# Patient Record
Sex: FEMALE | Race: WHITE | Employment: OTHER | URBAN - METROPOLITAN AREA
[De-identification: names, ages, dates, MRNs, and addresses within clinical notes are randomized per-mention and may not be internally consistent; named-entity substitution may affect disease eponyms.]

---

## 2021-04-02 ENCOUNTER — TRANSCRIBE ORDER (OUTPATIENT)
Dept: SCHEDULING | Age: 71
End: 2021-04-02

## 2021-06-07 ENCOUNTER — APPOINTMENT (OUTPATIENT)
Dept: CT IMAGING | Age: 71
End: 2021-06-07
Attending: EMERGENCY MEDICINE
Payer: MEDICARE

## 2021-06-07 ENCOUNTER — HOSPITAL ENCOUNTER (EMERGENCY)
Age: 71
Discharge: HOME | End: 2021-06-07
Attending: EMERGENCY MEDICINE
Payer: MEDICARE

## 2021-06-07 VITALS
OXYGEN SATURATION: 99 % | HEIGHT: 70 IN | SYSTOLIC BLOOD PRESSURE: 161 MMHG | RESPIRATION RATE: 17 BRPM | WEIGHT: 126 LBS | TEMPERATURE: 98.3 F | BODY MASS INDEX: 18.04 KG/M2 | HEART RATE: 78 BPM | DIASTOLIC BLOOD PRESSURE: 89 MMHG

## 2021-06-07 DIAGNOSIS — R51.9 ACUTE NONINTRACTABLE HEADACHE, UNSPECIFIED HEADACHE TYPE: Primary | ICD-10-CM

## 2021-06-07 LAB
ALBUMIN SERPL-MCNC: 4.1 G/DL (ref 3.5–5)
ALBUMIN/GLOB SERPL: 1.2 {RATIO} (ref 1.1–2.2)
ALP SERPL-CCNC: 101 U/L (ref 45–117)
ALT SERPL-CCNC: 31 U/L (ref 12–78)
ANION GAP SERPL CALC-SCNC: 9 MMOL/L (ref 5–15)
AST SERPL W P-5'-P-CCNC: 20 U/L (ref 15–37)
BASOPHILS # BLD: 0 K/UL (ref 0–0.2)
BASOPHILS NFR BLD: 1 % (ref 0–2.5)
BILIRUB SERPL-MCNC: 0.4 MG/DL (ref 0.2–1)
BUN SERPL-MCNC: 15 MG/DL (ref 6–20)
BUN/CREAT SERPL: 20 (ref 12–20)
CA-I BLD-MCNC: 9.4 MG/DL (ref 8.5–10.1)
CHLORIDE SERPL-SCNC: 106 MMOL/L (ref 97–108)
CO2 SERPL-SCNC: 28 MMOL/L (ref 21–32)
CREAT SERPL-MCNC: 0.76 MG/DL (ref 0.55–1.02)
EOSINOPHIL # BLD: 0 K/UL (ref 0–0.7)
EOSINOPHIL NFR BLD: 0 % (ref 0.9–2.9)
ERYTHROCYTE [DISTWIDTH] IN BLOOD BY AUTOMATED COUNT: 13.2 % (ref 11.5–14.5)
GLOBULIN SER CALC-MCNC: 3.4 G/DL (ref 2–4)
GLUCOSE SERPL-MCNC: 138 MG/DL (ref 65–100)
HCT VFR BLD AUTO: 44.9 % (ref 36–46)
HGB BLD-MCNC: 15.2 G/DL (ref 13.5–17.5)
LYMPHOCYTES # BLD: 0.6 K/UL (ref 1–4.8)
LYMPHOCYTES NFR BLD: 10 % (ref 20.5–51.1)
MAGNESIUM SERPL-MCNC: 1.8 MG/DL (ref 1.6–2.4)
MCH RBC QN AUTO: 33.1 PG (ref 31–34)
MCHC RBC AUTO-ENTMCNC: 33.9 G/DL (ref 31–36)
MCV RBC AUTO: 97.7 FL (ref 80–100)
MONOCYTES # BLD: 0.1 K/UL (ref 0.2–2.4)
MONOCYTES NFR BLD: 2 % (ref 1.7–9.3)
NEUTS SEG # BLD: 5.4 K/UL (ref 1.8–7.7)
NEUTS SEG NFR BLD: 87 % (ref 42–75)
NRBC # BLD: 0 K/UL
NRBC BLD-RTO: 0.1 PER 100 WBC
PLATELET # BLD AUTO: 159 K/UL (ref 150–400)
PMV BLD AUTO: 7.9 FL (ref 6.5–11.5)
POTASSIUM SERPL-SCNC: 3.7 MMOL/L (ref 3.5–5.1)
PROT SERPL-MCNC: 7.5 G/DL (ref 6.4–8.2)
RBC # BLD AUTO: 4.59 M/UL (ref 4.5–5.9)
SODIUM SERPL-SCNC: 143 MMOL/L (ref 136–145)
WBC # BLD AUTO: 6.2 K/UL (ref 4.4–11.3)

## 2021-06-07 PROCEDURE — 74011000250 HC RX REV CODE- 250: Performed by: EMERGENCY MEDICINE

## 2021-06-07 PROCEDURE — 85025 COMPLETE CBC W/AUTO DIFF WBC: CPT

## 2021-06-07 PROCEDURE — 96375 TX/PRO/DX INJ NEW DRUG ADDON: CPT

## 2021-06-07 PROCEDURE — 96374 THER/PROPH/DIAG INJ IV PUSH: CPT

## 2021-06-07 PROCEDURE — 36415 COLL VENOUS BLD VENIPUNCTURE: CPT

## 2021-06-07 PROCEDURE — 74011250636 HC RX REV CODE- 250/636: Performed by: EMERGENCY MEDICINE

## 2021-06-07 PROCEDURE — 99284 EMERGENCY DEPT VISIT MOD MDM: CPT

## 2021-06-07 PROCEDURE — 83735 ASSAY OF MAGNESIUM: CPT

## 2021-06-07 PROCEDURE — 70450 CT HEAD/BRAIN W/O DYE: CPT

## 2021-06-07 PROCEDURE — 80053 COMPREHEN METABOLIC PANEL: CPT

## 2021-06-07 RX ORDER — FENTANYL CITRATE 50 UG/ML
25 INJECTION, SOLUTION INTRAMUSCULAR; INTRAVENOUS
Status: COMPLETED | OUTPATIENT
Start: 2021-06-07 | End: 2021-06-07

## 2021-06-07 RX ORDER — ONDANSETRON 4 MG/1
4 TABLET, ORALLY DISINTEGRATING ORAL
Qty: 20 TABLET | Refills: 0 | Status: SHIPPED | OUTPATIENT
Start: 2021-06-07

## 2021-06-07 RX ORDER — ONDANSETRON 2 MG/ML
4 INJECTION INTRAMUSCULAR; INTRAVENOUS
Status: COMPLETED | OUTPATIENT
Start: 2021-06-07 | End: 2021-06-07

## 2021-06-07 RX ORDER — KETOROLAC TROMETHAMINE 30 MG/ML
30 INJECTION, SOLUTION INTRAMUSCULAR; INTRAVENOUS
Status: COMPLETED | OUTPATIENT
Start: 2021-06-07 | End: 2021-06-07

## 2021-06-07 RX ORDER — BUTALBITAL, ASPIRIN, AND CAFFEINE 325; 50; 40 MG/1; MG/1; MG/1
1 CAPSULE ORAL
Qty: 20 CAPSULE | Refills: 0 | Status: SHIPPED | OUTPATIENT
Start: 2021-06-07 | End: 2021-09-05

## 2021-06-07 RX ADMIN — ONDANSETRON 4 MG: 2 INJECTION INTRAMUSCULAR; INTRAVENOUS at 12:41

## 2021-06-07 RX ADMIN — KETOROLAC TROMETHAMINE 30 MG: 30 INJECTION, SOLUTION INTRAMUSCULAR at 14:03

## 2021-06-07 RX ADMIN — FAMOTIDINE 20 MG: 10 INJECTION INTRAVENOUS at 12:41

## 2021-06-07 RX ADMIN — SODIUM CHLORIDE 1000 ML: 9 INJECTION, SOLUTION INTRAVENOUS at 14:03

## 2021-06-07 RX ADMIN — FENTANYL CITRATE 25 MCG: 50 INJECTION, SOLUTION INTRAMUSCULAR; INTRAVENOUS at 12:41

## 2021-06-07 NOTE — ED TRIAGE NOTES
Chief Complaint   Patient presents with    Headache    Nausea     Pt reports vomiting since 2 am with generalized headache. Pt denies Nausea, reports estimated 5 episodes of vomiting. Pt hx MS.

## 2021-06-07 NOTE — ED PROVIDER NOTES
EMERGENCY DEPARTMENT HISTORY AND PHYSICAL EXAM      Date: 6/7/2021  Patient Name: Lance Otoole    History of Presenting Illness     Chief Complaint   Patient presents with    Headache    Nausea       History Provided By: Patient    HPI: Lance Otoole, 73 y.o. female with a past medical history significant No significant past medical history presents to the ED with cc of 5 episodes of vomiting since 2 AM headache came later patient currently pain scale of 6/10 frontal headache; denies shortness of breath no chest pain no abdominal pain no diarrhea    There are no other complaints, changes, or physical findings at this time. PCP: None    No current facility-administered medications on file prior to encounter. No current outpatient medications on file prior to encounter. Past History     Past Medical History:  Past Medical History:   Diagnosis Date    MS (congenital mitral stenosis)        Past Surgical History:  Past Surgical History:   Procedure Laterality Date    HX APPENDECTOMY      HX LAP CHOLECYSTECTOMY         Family History:  History reviewed. No pertinent family history. Social History:  Social History     Tobacco Use    Smoking status: Never Smoker    Smokeless tobacco: Never Used   Substance Use Topics    Alcohol use: Never    Drug use: Never       Allergies:  No Known Allergies      Review of Systems     Review of Systems   Constitutional: Negative for chills and fever. HENT: Negative for rhinorrhea and sore throat. Eyes: Positive for photophobia. Negative for pain. Respiratory: Negative for cough and shortness of breath. Cardiovascular: Negative for chest pain and leg swelling. Gastrointestinal: Positive for vomiting. Negative for abdominal pain. Endocrine: Negative for polydipsia and polyuria. Genitourinary: Negative for dysuria and urgency. Musculoskeletal: Negative for back pain, myalgias and neck pain. Skin: Negative for color change and pallor. Neurological: Positive for headaches. Negative for dizziness, weakness and numbness. Psychiatric/Behavioral: Negative. Physical Exam     Physical Exam  Vitals and nursing note reviewed. Constitutional:       General: She is not in acute distress. Appearance: Normal appearance. She is ill-appearing. HENT:      Head: Normocephalic and atraumatic. Mouth/Throat:      Mouth: Mucous membranes are dry. Pharynx: Oropharynx is clear. Eyes:      Extraocular Movements: Extraocular movements intact. Conjunctiva/sclera: Conjunctivae normal.      Pupils: Pupils are equal, round, and reactive to light. Cardiovascular:      Rate and Rhythm: Normal rate and regular rhythm. Pulses: Normal pulses. Heart sounds: Normal heart sounds. Pulmonary:      Effort: Pulmonary effort is normal.      Breath sounds: Normal breath sounds. Abdominal:      General: Abdomen is flat. Bowel sounds are decreased. Palpations: Abdomen is soft. Tenderness: There is no abdominal tenderness. Musculoskeletal:         General: Normal range of motion. Cervical back: Normal range of motion and neck supple. Skin:     General: Skin is warm and dry. Capillary Refill: Capillary refill takes less than 2 seconds. Neurological:      General: No focal deficit present. Mental Status: She is alert and oriented to person, place, and time. Psychiatric:         Mood and Affect: Mood normal.         Behavior: Behavior normal.         Lab and Diagnostic Study Results     Labs -   No results found for this or any previous visit (from the past 12 hour(s)). Radiologic Studies -   @lastxrresult@  CT Results  (Last 48 hours)    None        CXR Results  (Last 48 hours)    None            Medical Decision Making   - I am the first provider for this patient. - I reviewed the vital signs, available nursing notes, past medical history, past surgical history, family history and social history.     - Initial assessment performed. The patients presenting problems have been discussed, and they are in agreement with the care plan formulated and outlined with them. I have encouraged them to ask questions as they arise throughout their visit. Vital Signs-Reviewed the patient's vital signs. Patient Vitals for the past 12 hrs:   Temp Pulse Resp BP SpO2   06/07/21 1215 98.3 °F (36.8 °C) 72 18 (!) 127/56 99 %       Records Reviewed: Nursing Notes    The patient presents with headache with a differential diagnosis of  CVA, meningitis/encephalitis, migraine, tension headache and vascular headache      ED Course:          Provider Notes (Medical Decision Making): MDM       Procedures   Medical Decision Makingedical Decision Making  Performed by: Blanco Galeano MD  PROCEDURES:  Procedures       Disposition   Disposition: Condition stable and improved  DC- Adult Discharges: All of the diagnostic tests were reviewed and questions answered. Diagnosis, care plan and treatment options were discussed. The patient understands the instructions and will follow up as directed. The patients results have been reviewed with them. They have been counseled regarding their diagnosis. The patient verbally convey understanding and agreement of the signs, symptoms, diagnosis, treatment and prognosis and additionally agrees to follow up as recommended with their PCP in 24 - 48 hours. They also agree with the care-plan and convey that all of their questions have been answered. I have also put together some discharge instructions for them that include: 1) educational information regarding their diagnosis, 2) how to care for their diagnosis at home, as well a 3) list of reasons why they would want to return to the ED prior to their follow-up appointment, should their condition change. DISCHARGE PLAN:  1. There are no discharge medications for this patient. 2.   Follow-up Information    None       3.   Return to ED if worse   4. There are no discharge medications for this patient. Diagnosis     Clinical Impression: No diagnosis found. Attestations:    Ana Cowan MD    Please note that this dictation was completed with [employer], the computer voice recognition software. Quite often unanticipated grammatical, syntax, homophones, and other interpretive errors are inadvertently transcribed by the computer software. Please disregard these errors. Please excuse any errors that have escaped final proofreading. Thank you.

## 2021-06-14 ENCOUNTER — HOSPITAL ENCOUNTER (OUTPATIENT)
Dept: INFUSION THERAPY | Age: 71
Discharge: HOME | End: 2021-06-14
Attending: FAMILY MEDICINE
Payer: MEDICARE

## 2021-06-14 VITALS
HEIGHT: 69 IN | RESPIRATION RATE: 20 BRPM | DIASTOLIC BLOOD PRESSURE: 71 MMHG | WEIGHT: 125 LBS | TEMPERATURE: 97 F | BODY MASS INDEX: 18.51 KG/M2 | HEART RATE: 80 BPM | SYSTOLIC BLOOD PRESSURE: 135 MMHG | OXYGEN SATURATION: 100 %

## 2021-06-14 PROCEDURE — 74011250636 HC RX REV CODE- 250/636

## 2021-06-14 PROCEDURE — 96365 THER/PROPH/DIAG IV INF INIT: CPT

## 2021-06-14 PROCEDURE — 74011000258 HC RX REV CODE- 258

## 2021-06-14 RX ORDER — CARBAMAZEPINE 200 MG/1
200 TABLET ORAL 3 TIMES DAILY
COMMUNITY

## 2021-06-14 RX ORDER — PANTOPRAZOLE SODIUM 40 MG/1
40 TABLET, DELAYED RELEASE ORAL DAILY
COMMUNITY
End: 2021-06-30

## 2021-06-14 RX ORDER — LORATADINE 10 MG/1
10 TABLET ORAL
COMMUNITY

## 2021-06-15 ENCOUNTER — HOSPITAL ENCOUNTER (OUTPATIENT)
Dept: INFUSION THERAPY | Age: 71
Discharge: HOME | End: 2021-06-15
Payer: MEDICARE

## 2021-06-15 VITALS
HEART RATE: 81 BPM | DIASTOLIC BLOOD PRESSURE: 79 MMHG | OXYGEN SATURATION: 100 % | RESPIRATION RATE: 20 BRPM | TEMPERATURE: 97.6 F | SYSTOLIC BLOOD PRESSURE: 135 MMHG

## 2021-06-15 PROCEDURE — 96365 THER/PROPH/DIAG IV INF INIT: CPT

## 2021-06-15 PROCEDURE — 74011250636 HC RX REV CODE- 250/636: Performed by: FAMILY MEDICINE

## 2021-06-15 PROCEDURE — 74011000258 HC RX REV CODE- 258: Performed by: FAMILY MEDICINE

## 2021-06-15 RX ADMIN — METHYLPREDNISOLONE SODIUM SUCCINATE: 1 INJECTION, POWDER, LYOPHILIZED, FOR SOLUTION INTRAMUSCULAR; INTRAVENOUS at 10:47

## 2021-06-16 ENCOUNTER — HOSPITAL ENCOUNTER (OUTPATIENT)
Dept: INFUSION THERAPY | Age: 71
Discharge: HOME | End: 2021-06-16
Payer: MEDICARE

## 2021-06-16 VITALS
OXYGEN SATURATION: 100 % | RESPIRATION RATE: 20 BRPM | TEMPERATURE: 97.3 F | DIASTOLIC BLOOD PRESSURE: 64 MMHG | HEART RATE: 72 BPM | SYSTOLIC BLOOD PRESSURE: 139 MMHG

## 2021-06-16 PROCEDURE — 74011250636 HC RX REV CODE- 250/636: Performed by: FAMILY MEDICINE

## 2021-06-16 PROCEDURE — 96365 THER/PROPH/DIAG IV INF INIT: CPT

## 2021-06-16 PROCEDURE — 74011000258 HC RX REV CODE- 258: Performed by: FAMILY MEDICINE

## 2021-06-16 RX ADMIN — METHYLPREDNISOLONE SODIUM SUCCINATE: 1 INJECTION, POWDER, LYOPHILIZED, FOR SOLUTION INTRAMUSCULAR; INTRAVENOUS at 10:37

## 2021-06-29 DIAGNOSIS — K21.00 GASTROESOPHAGEAL REFLUX DISEASE WITH ESOPHAGITIS WITHOUT HEMORRHAGE: Primary | ICD-10-CM

## 2021-06-30 RX ORDER — PANTOPRAZOLE SODIUM 40 MG/1
TABLET, DELAYED RELEASE ORAL
Qty: 90 TABLET | Refills: 3 | Status: SHIPPED | OUTPATIENT
Start: 2021-06-30 | End: 2022-04-21

## 2021-09-28 ENCOUNTER — TRANSCRIBE ORDER (OUTPATIENT)
Dept: SCHEDULING | Age: 71
End: 2021-09-28

## 2021-10-04 ENCOUNTER — HOSPITAL ENCOUNTER (OUTPATIENT)
Dept: PHYSICAL THERAPY | Age: 71
Discharge: HOME | End: 2021-10-04
Payer: MEDICARE

## 2021-10-04 ENCOUNTER — TRANSCRIBE ORDER (OUTPATIENT)
Dept: SCHEDULING | Age: 71
End: 2021-10-04

## 2021-10-04 DIAGNOSIS — G35 MULTIPLE SCLEROSIS (HCC): Primary | ICD-10-CM

## 2021-10-04 PROCEDURE — 97161 PT EVAL LOW COMPLEX 20 MIN: CPT

## 2021-10-04 NOTE — PROGRESS NOTES
[address]  [place], [place]  Ph: [phone]    Fax: [phone]    Plan of Care/Statement of Necessity for Physical Therapy Services  2-15    Patient name: Marbella Yin  : 1950  Provider#: [id number]  Referral source: Amado Stinson MD      Medical/Treatment Diagnosis: Gait instability [R26.81]     Prior Hospitalization: see medical history     Comorbidities: see medical history  Prior Level of Function: Independent with all ADL's; no use of AD  Medications: Verified on Patient Summary List  Start of Care: 10/4/2021      Onset Date: 2021   The Plan of Care and following information is based on the information from the initial evaluation. 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 The course of MS is relapsing with muscle spasms, cognitive and memory changes. She is alert x 4. Ocassional flare ups treated with steriod treatments. Referred to Physical Therapy for gait instability due to lower extremity weakness. Weakness is more pronounced on the right side. Patient has a cane and regular walker, but she prefers not to use it at this time. Patient is a slight fall due to TUG Test and scored 39/56 on CHOW Test. Patient may benefit from skilled  Physical Therapy to address gait disturbance with balance/coordination/proprioception exercises and strengthening of the lower extremities to maximize mobility as much as possible. She will be instructed in a Home exercise program with 1:1 supervision and provided with handouts for compliance.         Evaluation Complexity History LOW Complexity : Zero comorbidities / personal factors that will impact the outcome / POC; Examination LOW Complexity : 1-2 Standardized tests and measures addressing body structure, function, activity limitation and / or participation in recreation  ;Presentation LOW Complexity : Stable, uncomplicated  ;Clinical Decision Making TUG Score: 13 seconds  Overall Complexity Rating: LOW     Problem List: decrease strength, impaired gait/ balance and decrease ADL/ functional abilitiies   Treatment Plan may include any combination of the following: Therapeutic exercise, Physical agent/modality, Gait/balance training, Patient education, Functional mobility training and Stair training  Patient / Family readiness to learn indicated by: asking questions  Persons(s) to be included in education: patient (P)  Barriers to Learning/Limitations: None  Patient Goal (s): To improve balance\"  Patient Self Reported Health Status: fair  Rehabilitation Potential: fair    Short Term Goals: To be accomplished in 5 treatments. To be able to perform sit to stand x 10 with no hand support with increasing strength of LE to 4/5. Patient will be able to ambulate up and down 4 steps with 1 hand support safely to get in and out of the house. Patient will be able to perform HEP independently x 10-20 reps with no difficulty to maintain mobility and strength. Long Term Goals: To be accomplished in 10  treatments. Patient will be able to squat and  items off the floor holding onto chair with little difficulty. Patient will be able to perform household chores for at least 10 minutes without rest with increased LE strength 4/5. Patient will be able to ambulate indoors or outdoors for a daily 10 minute walk to maintain balance and improve endurance level with no difficulty. Frequency / Duration: Patient to be seen 2 times per week for 10 treatments. Patient/ Caregiver education and instruction: exercises    [x]  Plan of care has been reviewed with PTA        Certification Period: 10/4/2021 to December 31, 2021    Sugar Womack PT,  10/4/2021     ________________________________________________________________________    I certify that the above Therapy Services are being furnished while the patient is under my care.  I agree with the treatment plan and certify that this therapy is necessary.     [de-identified] Signature:____________________  Date:____________Time: _________    Patient name: Jamin Meneses  : 1950  Provider#: [id number]

## 2021-10-04 NOTE — PROGRESS NOTES
PT INITIAL EVALUATION NOTE - [facility] 2-15    Patient Name: Mallory Chaves  Date:10/4/2021  : 1950  [x]  Patient  Verified  Payor: Mikaela Thayer / Plan: [place] MED[place] / Product Type: Medicare /    In time: 3:00 PM   Out time: 3:55 PM  Visit #: 1    Treatment Area: Gait instability [R26.81]    SUBJECTIVE  Pain Level (0-10 scale): 3/10  Any medication changes, allergies to medications, adverse drug reactions, diagnosis change, or new procedure performed?: [] No    [x] Yes (see summary sheet for update)    Subjective: 73year old white female who has been diagnosed with MS for 35 years. 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 She continues with daily activities as needed. Referred to MD because of gait instability. Patient stated that she does have a cane and a regular walker, but prefer not to use them. PLOF: Independent with all ADL's; no use of AD  Mechanism of Injury: none  Previous Treatment/Compliance: Medication  Radiographs: none  What increases symptoms: none  What decreases symptoms: none  Work Hx: retired  Living Situation: Lives at home with   Pt Goals: To improve gait  Barriers: LE Muscle weakness  Motivation: Good  Substance use: None reported  Cognition: A&O x 4  Fall Assessment: TUG Test: 13 seconds  Past Medical History:  Past Medical History:   Diagnosis Date    MS (congenital mitral stenosis)      Past Surgical History:  Past Surgical History:   Procedure Laterality Date    HX APPENDECTOMY      HX LAP CHOLECYSTECTOMY       Current Medications:  Current Outpatient Medications   Medication Instructions    carBAMazepine (TEGRETOL) 200 mg, Oral, 3 TIMES DAILY    loratadine (CLARITIN) 10 mg, Oral, DAILY AS NEEDED    ondansetron (ZOFRAN ODT) 4 mg, SubLINGual, EVERY 8 HOURS AS NEEDED    pantoprazole (PROTONIX) 40 mg tablet TAKE 1 TABLET EVERY DAY          OBJECTIVE/EXAMINATION    Posture:  Forward Head Posture    Other Observations: not able to perform Tandem walking and one Leg stance during the CHOW Test  CHOW TEST: 39/56  Gait and Functional Mobility:  No assistive device and ambulate with a wide base of support  Palpation: Tenderness of the upper cervical    Hip:  Strength AROM PROM     Right Left        Flexion 4/5 4/5        Extension 4/5 4/5        Abduction 3+/5 4/5        Adduction 3/5 3/5                           Knee:  Strength AROM PROM     Right Left        Flexion 4/5 4/5        Extension 4/5 4/5       Ankle  Strength       Right Left        Dorsiflexion 5/5 5/5        Plantarflexion 5/5 5/5        Inversion 5/5 5/5        Eversion 5/5 5/5       *All strength measures are on a scale with 5 as a maximum, if a space is left blank it was not tested. All ROM measurements are measured in degrees.         Pain Level (0-10 scale) post treatment: 3/10    ASSESSMENT/Changes in Function:   [x]  See Plan of [facility], ,  10/4/2021

## 2021-10-07 ENCOUNTER — HOSPITAL ENCOUNTER (OUTPATIENT)
Dept: PHYSICAL THERAPY | Age: 71
Discharge: HOME | End: 2021-10-07
Payer: MEDICARE

## 2021-10-07 PROCEDURE — 97110 THERAPEUTIC EXERCISES: CPT

## 2021-10-07 NOTE — PROGRESS NOTES
PT DAILY TREATMENT NOTE - [facility] 2-15    Patient Name: Naveen Cao  Date:10/7/2021  : 1950  [x]  Patient  Verified  Payor: [place] MEDICARE / Plan: [place] MEDICARE PART A & B / Product Type: Medicare /    In time: 2:00 PM Out time: 2:40 PM  Total Treatment Time (min): 40  Total Timed Codes (min): 40  1:1 Treatment Time ( only): 40   Visit #:  2    Treatment Area: Gait instability [R26.81]    SUBJECTIVE  Pain Level (0-10 scale): 0/10  Any medication changes, allergies to medications, adverse drug reactions, diagnosis change, or new procedure performed?: [x] No    [] Yes (see summary sheet for update)  Subjective functional status/changes:   [] No changes reported    I have a problem with sit to stand and moving my leg in and out without dragging. OBJECTI    40 min Therapeutic Exercise:  [x] See flow sheet :   Rationale: increase strength, improve coordination, improve balance and increase proprioception to improve the patients ability to   perform sit to stand x 10 with no hand support with increasing strength of LE to 4/5         With   [] TE   [] TA   [] neuro   [] other: Patient Education: [x] Review HEP    [] Progressed/Changed HEP based on:   [] positioning   [] body mechanics   [] transfers   [] heat/ice application    [] other:      Other Objective/Functional Measures: Balance Exercises - tandem walking  Established an exercise for LE mobility that can be performed in sitting and supine. Pain Level (0-10 scale) post treatment: 0/10    ASSESSMENT/Changes in Function:   The pt tolerated treatment well in supine position. Patient has hip ab/add weakness and have a tendency to drag leg. Performing sit to stand position her knees come together.  Patient will continue to benefit from skilled PT services to address functional mobility deficits, analyze and cue movement patterns and address imbalance/dizziness to attain remaining goals     [x]  See Plan of Care  []  See progress note/recertification  []  See Discharge Summary         Progress towards goals / Updated goals:  Short Term Goals: To be accomplished in 5 treatments. To be able to perform sit to stand x 10 with no hand support with increasing strength of LE to 4/5. Patient will be able to ambulate up and down 4 steps with 1 hand support safely to get in and out of the house. Patient will be able to perform HEP independently x 10-20 reps with no difficulty to maintain mobility and strength.     Long Term Goals: To be accomplished in 10  treatments. Patient will be able to squat and  items off the floor holding onto chair with little difficulty. Patient will be able to perform household chores for at least 10 minutes without rest with increased LE strength 4/5. Patient will be able to ambulate indoors or outdoors for a daily 10 minute walk to maintain balance and improve endurance level with no difficulty.     PLAN  [x]  Upgrade activities as tolerated     [x]  Continue plan of care  []  Update interventions per flow sheet       []  Discharge due to:_  []  Other:_      Sotero Boxer, PT,  10/7/2021

## 2021-10-11 ENCOUNTER — HOSPITAL ENCOUNTER (OUTPATIENT)
Dept: PHYSICAL THERAPY | Age: 71
Discharge: HOME | End: 2021-10-11
Payer: MEDICARE

## 2021-10-11 PROCEDURE — 97150 GROUP THERAPEUTIC PROCEDURES: CPT

## 2021-10-11 NOTE — PROGRESS NOTES
PT DAILY TREATMENT NOTE - [facility] 2-15    Patient Name: Marbella Yin  Date:10/11/2021  : 1950  [x]  Patient  Verified  Payor: Hannah Hazard / Plan: [place] MEDICARE PART A & B / Product Type: Medicare /    In time: 10:30  Out time: 11:19  Total Treatment Time (min): 49  Total Timed Codes (min): 0  1:1 Treatment Time (MC only): 0   Visit #:  3    Treatment Area: Gait instability [R26.81]    SUBJECTIVE  Pain Level (0-10 scale): 0/10  Any medication changes, allergies to medications, adverse drug reactions, diagnosis change, or new procedure performed?: [x] No    [] Yes (see summary sheet for update)  Subjective functional status/changes:   [] No changes reported  Pt reports no falls. OBJECTIVE         49 min Group Therapy:  [x] See flow sheet :   Billed while completing TE with other pts throuhgout session    With   [x] TE   [] TA   [] neuro   [] other: Patient Education: [x] Review HEP    [] Progressed/Changed HEP based on:   [] positioning   [] body mechanics   [] transfers   [] heat/ice application    [] other:      Pain Level (0-10 scale) post treatment: 0/10    ASSESSMENT/Changes in Function: The pt tolerated treatment fairly well. Cueing during session to stay on task. Patient will continue to benefit from skilled PT services to address functional mobility deficits, address ROM deficits and address strength deficits to attain remaining goals     [x]  See Plan of Care  []  See progress note/recertification  []  See Discharge Summary         Progress towards goals / Updated goals:  Short Term Goals: To be accomplished in 5 treatments. To be able to perform sit to stand x 10 with no hand support with increasing strength of LE to 4/5. Patient will be able to ambulate up and down 4 steps with 1 hand support safely to get in and out of the house.   Patient will be able to perform HEP independently x 10-20 reps with no difficulty to maintain mobility and strength.     Long Term Goals: To be accomplished in 10  treatments. Patient will be able to squat and  items off the floor holding onto chair with little difficulty. Patient will be able to perform household chores for at least 10 minutes without rest with increased LE strength 4/5. Patient will be able to ambulate indoors or outdoors for a daily 10 minute walk to maintain balance and improve endurance level with no difficulty.     PLAN  [x]  Upgrade activities as tolerated     [x]  Continue plan of care  []  Update interventions per flow sheet       []  Discharge due to:_  []  Other:_      Gladys Moya PTA, JUAN ANTONIO 10/11/2021

## 2021-10-13 ENCOUNTER — HOSPITAL ENCOUNTER (OUTPATIENT)
Dept: PHYSICAL THERAPY | Age: 71
Discharge: HOME | End: 2021-10-13
Payer: MEDICARE

## 2021-10-13 PROCEDURE — 97150 GROUP THERAPEUTIC PROCEDURES: CPT

## 2021-10-13 NOTE — PROGRESS NOTES
PT DAILY TREATMENT NOTE - [facility] 2-15    Patient Name: Yon Kirby  Date:10/13/2021  : 1950  [x]  Patient  Verified  Payor: Stefano Chamberlain / Plan: [place] MEDICARE PART A & B / Product Type: Medicare /    In time:1042 am   Out time:1132 am  Total Treatment Time (min): 50  Total Timed Codes (min): 50  1:1 Treatment Time ( only): 0   Visit #:  4    Treatment Area: Gait instability [R26.81]    SUBJECTIVE  Pain Level (0-10 scale):0/10  Any medication changes, allergies to medications, adverse drug reactions, diagnosis change, or new procedure performed?: [x] No    [] Yes (see summary sheet for update)  Subjective functional status/changes:   [] No changes reported  \"Pt reports having back pain that she feels throws her balance off. \"    OBJECTIVE      50 min Group Therapy:  [] See flow sheet :   Billed while completing peer interaction during session with therapist    With   [] TE   [] TA   [] neuro   [] other: Patient Education: [x] Review HEP    [] Progressed/Changed HEP based on:   [] positioning   [] body mechanics   [] transfers   [] heat/ice application    [] other:        Pain Level (0-10 scale) post treatment: 0/10    ASSESSMENT/Changes in Function:   The pt tolerated treatment session with focus on balance and dynamic standing since pt seems to be compliant with HEP. Use of visual feedback with mirror use and rest as needed with some mm fatigue . Patient will continue to benefit from skilled PT services to modify and progress therapeutic interventions, address functional mobility deficits, address ROM deficits and address strength deficits to attain remaining goals     [x]  See Plan of Care  []  See progress note/recertification  []  See Discharge Summary         Progress towards goals / Updated goals:  Short Term Goals: To be accomplished in 5 treatments. To be able to perform sit to stand x 10 with no hand support with increasing strength of LE to 4/5.   Patient will be able to ambulate up and down 4 steps with 1 hand support safely to get in and out of the house. Patient will be able to perform HEP independently x 10-20 reps with no difficulty to maintain mobility and strength.     Long Term Goals: To be accomplished in 10  treatments. Patient will be able to squat and  items off the floor holding onto chair with little difficulty. Patient will be able to perform household chores for at least 10 minutes without rest with increased LE strength 4/5. Patient will be able to ambulate indoors or outdoors for a daily 10 minute walk to maintain balance and improve endurance level with no difficulty.       PLAN  [x]  Upgrade activities as tolerated     [x]  Continue plan of care  []  Update interventions per flow sheet       []  Discharge due to:_  []  Other:_      Raghu Guajardo 10/13/2021

## 2021-10-18 ENCOUNTER — HOSPITAL ENCOUNTER (OUTPATIENT)
Dept: PHYSICAL THERAPY | Age: 71
Discharge: HOME | End: 2021-10-18
Payer: MEDICARE

## 2021-10-18 ENCOUNTER — TRANSCRIBE ORDER (OUTPATIENT)
Dept: REGISTRATION | Age: 71
End: 2021-10-18

## 2021-10-18 DIAGNOSIS — G35 MULTIPLE SCLEROSIS (HCC): Primary | ICD-10-CM

## 2021-10-18 PROCEDURE — 97150 GROUP THERAPEUTIC PROCEDURES: CPT

## 2021-10-18 PROCEDURE — 97110 THERAPEUTIC EXERCISES: CPT

## 2021-10-18 NOTE — PROGRESS NOTES
PT DAILY TREATMENT NOTE - [facility] 2-15    Patient Name: Jossy Martinez  Date:10/18/2021  : 1950  [x]  Patient  Verified  Payor: [facility] / Plan: [place] / Product Type: Medicare /    In time:128 pm  Out time:228 pm   Total Treatment Time (min): 60  Total Timed Codes (min): 60  1:1 Treatment Time ([address] only): 37   Visit #:  5    Treatment Area: Gait instability [R26.81]    SUBJECTIVE  Pain Level (0-10 scale): 0/10  Any medication changes, allergies to medications, adverse drug reactions, diagnosis change, or new procedure performed?: [x] No    [] Yes (see summary sheet for update)  Subjective functional status/changes:   [] No changes reported  \"Pt reports still having weakness and issues with back and balance. \"    OBJECTIVE    37 min Therapeutic Exercise:  [] See flow sheet :   Rationale: increase ROM, increase strength and improve coordination to improve the patients ability to increase functional mobility. 23 min Group Therapy:  [x] See flow sheet :   Billed while completing peer interaction during session with therapist      With   [] TE   [] TA   [] neuro   [] other: Patient Education: [x] Review HEP    [] Progressed/Changed HEP based on:   [] positioning   [] body mechanics   [] transfers   [] heat/ice application    [] other:          Pain Level (0-10 scale) post treatment: 0/10    ASSESSMENT/Changes in Function:   The pt tolerated treatment session with work on basic stretching and functional motion. Pt notes compliance HEP.  .   Patient will continue to benefit from skilled PT services to modify and progress therapeutic interventions, address functional mobility deficits, address ROM deficits, address strength deficits and analyze and cue movement patterns to attain remaining goals     [x]  See Plan of Care  []  See progress note/recertification  []  See Discharge Summary         Progress towards goals / Updated goals:  Short Term Goals: To be accomplished in 5 treatments. To be able to perform sit to stand x 10 with no hand support with increasing strength of LE to 4/5. Patient will be able to ambulate up and down 4 steps with 1 hand support safely to get in and out of the house. Patient will be able to perform HEP independently x 10-20 reps with no difficulty to maintain mobility and strength.     Long Term Goals: To be accomplished in 10  treatments. Patient will be able to squat and  items off the floor holding onto chair with little difficulty. Patient will be able to perform household chores for at least 10 minutes without rest with increased LE strength 4/5. Patient will be able to ambulate indoors or outdoors for a daily 10 minute walk to maintain balance and improve endurance level with no difficulty.       PLAN  [x]  Upgrade activities as tolerated     [x]  Continue plan of care  []  Update interventions per flow sheet       []  Discharge due to:_  []  Other:_      Sherif Hernandez 10/18/2021

## 2021-10-19 ENCOUNTER — HOSPITAL ENCOUNTER (OUTPATIENT)
Dept: MRI IMAGING | Age: 71
Discharge: HOME | End: 2021-10-19
Payer: MEDICARE

## 2021-10-19 ENCOUNTER — HOSPITAL ENCOUNTER (OUTPATIENT)
Dept: LAB | Age: 71
Discharge: HOME | End: 2021-10-19
Payer: MEDICARE

## 2021-10-19 DIAGNOSIS — G35 MULTIPLE SCLEROSIS (HCC): ICD-10-CM

## 2021-10-19 LAB — CREAT SERPL-MCNC: 0.97 MG/DL (ref 0.55–1.02)

## 2021-10-19 PROCEDURE — 74011250636 HC RX REV CODE- 250/636: Performed by: PSYCHIATRY & NEUROLOGY

## 2021-10-19 PROCEDURE — 36415 COLL VENOUS BLD VENIPUNCTURE: CPT

## 2021-10-19 PROCEDURE — 70553 MRI BRAIN STEM W/O & W/DYE: CPT

## 2021-10-19 PROCEDURE — A9576 INJ PROHANCE MULTIPACK: HCPCS | Performed by: PSYCHIATRY & NEUROLOGY

## 2021-10-19 PROCEDURE — 72156 MRI NECK SPINE W/O & W/DYE: CPT

## 2021-10-19 PROCEDURE — 82565 ASSAY OF CREATININE: CPT

## 2021-10-19 RX ADMIN — GADOTERIDOL 12 ML: 279.3 INJECTION, SOLUTION INTRAVENOUS at 10:51

## 2021-10-20 ENCOUNTER — HOSPITAL ENCOUNTER (OUTPATIENT)
Dept: PHYSICAL THERAPY | Age: 71
Discharge: HOME | End: 2021-10-20
Payer: MEDICARE

## 2021-10-20 PROCEDURE — 97110 THERAPEUTIC EXERCISES: CPT

## 2021-10-20 PROCEDURE — 97150 GROUP THERAPEUTIC PROCEDURES: CPT

## 2021-10-20 NOTE — PROGRESS NOTES
PT DAILY TREATMENT NOTE - [facility] 2-15    Patient Name: Heriberto Rodarte  Date:10/20/2021  : 1950  [x]  Patient  Verified  Payor: Tomás Keep / Plan: [place] MEDICARE PART A & B / Product Type: Medicare /    In time:1:30 PM  Out time: 2:15 PM  Total Treatment Time (min): 45  Total Timed Codes (min): 30  1:1 Treatment Time ([facility] only): 30   Visit #:  6    Treatment Area: Gait instability [R26.81]    SUBJECTIVE  Pain Level (0-10 scale): 0/10  Any medication changes, allergies to medications, adverse drug reactions, diagnosis change, or new procedure performed?: [x] No    [] Yes (see summary sheet for update)  Subjective functional status/changes:   [] No changes reported  I cannot stand to be in cold weather. OBJECTIVE      30 min Therapeutic Exercise:  [x] See flow sheet :   Rationale: increase strength, improve coordination, improve balance and increase proprioception to improve the patients ability to   perform sit to stand x 10 with no hand support with increasing strength of LE to 4/5.                15 min Group Therapy:  [x] See flow sheet :   Billed while completing exercises overlapping with patient. With   [] TE   [] TA   [] neuro   [] other: Patient Education: [x] Review HEP    [] Progressed/Changed HEP based on:   [] positioning   [] body mechanics   [] transfers   [] heat/ice application    [] other:      Other Objective/Functional Measures:  LE strengthening and stabilization exercises performed in supine and sitting    Pain Level (0-10 scale) post treatment: 0/10    ASSESSMENT/Changes in Function:   The pt tolerated treatment. Weakness of hip extensors noticed today. Focused on hip abd/add strengthening with theraband to improve balance.     Patient will continue to benefit from skilled PT services to address strength deficits and analyze and cue movement patterns to attain remaining goals     [x]  See Plan of Care  []  See progress note/recertification  []  See Discharge Summary           Progress towards goals / Updated goals:  Short Term Goals: To be accomplished in 5 treatments. To be able to perform sit to stand x 10 with no hand support with increasing strength of LE to 4/5. Patient will be able to ambulate up and down 4 steps with 1 hand support safely to get in and out of the house. Patient will be able to perform HEP independently x 10-20 reps with no difficulty to maintain mobility and strength.     Long Term Goals: To be accomplished in 10  treatments. Patient will be able to squat and  items off the floor holding onto chair with little difficulty. Patient will be able to perform household chores for at least 10 minutes without rest with increased LE strength 4/5. Patient will be able to ambulate indoors or outdoors for a daily 10 minute walk to maintain balance and improve endurance level with no difficulty.     PLAN  [x]  Upgrade activities as tolerated     [x]  Continue plan of care  []  Update interventions per flow sheet       []  Discharge due to:_  []  Other:_      Verena Quijano PT,  10/20/2021

## 2021-10-25 ENCOUNTER — HOSPITAL ENCOUNTER (OUTPATIENT)
Dept: PHYSICAL THERAPY | Age: 71
Discharge: HOME | End: 2021-10-25
Payer: MEDICARE

## 2021-10-25 PROCEDURE — 97110 THERAPEUTIC EXERCISES: CPT

## 2021-10-25 NOTE — PROGRESS NOTES
PT DAILY TREATMENT NOTE - [facility] 2-15    Patient Name: Rosalva Garcia  Date:10/25/2021  : 1950  [x]  Patient  Verified  Payor: Marquise Roque / Plan: [place] MEDICARE PART A & B / Product Type: Medicare /    In time:132 pm  Out time:234 pm  Total Treatment Time (min): 62  Total Timed Codes (min): 62  1:1 Treatment Time ([facility] only): 62   Visit #:  7    Treatment Area: Gait instability [R26.81]    SUBJECTIVE  Pain Level (0-10 scale): 0/10  Any medication changes, allergies to medications, adverse drug reactions, diagnosis change, or new procedure performed?: [x] No    [] Yes (see summary sheet for update)  Subjective functional status/changes:   [] No changes reported  \"Pt reports still having issue with her balance no pain though. \"    OBJECTIVE      62 min Therapeutic Exercise:  [] See flow sheet :   Rationale: increase ROM, increase strength, improve coordination, improve balance and increase proprioception to improve the patients ability to increase functional mobility with whole body movement        With   [] TE   [] TA   [] neuro   [] other: Patient Education: [x] Review HEP    [] Progressed/Changed HEP based on:   [] positioning   [] body mechanics   [] transfers   [] heat/ice application    [] other:          Pain Level (0-10 scale) post treatment: 0/10    ASSESSMENT/Changes in Function:   The pt tolerated treatment session with continued work on general stretching and strengthening within tolerance levels for MS precautions. Pt continues to work hard on all ex and does pace her self to prevent over heating. Pt noted compliance with HEP.    Patient will continue to benefit from skilled PT services to modify and progress therapeutic interventions, address functional mobility deficits, address ROM deficits, address strength deficits, analyze and cue movement patterns, assess and modify postural abnormalities and address imbalance/dizziness to attain remaining goals     [x]  See Plan of Care  []  See progress note/recertification  []  See Discharge Summary         Progress towards goals / Updated goals:  Short Term Goals: To be accomplished in 5 treatments. To be able to perform sit to stand x 10 with no hand support with increasing strength of LE to 4/5. Patient will be able to ambulate up and down 4 steps with 1 hand support safely to get in and out of the house. Patient will be able to perform HEP independently x 10-20 reps with no difficulty to maintain mobility and strength.     Long Term Goals: To be accomplished in 10  treatments. Patient will be able to squat and  items off the floor holding onto chair with little difficulty. Patient will be able to perform household chores for at least 10 minutes without rest with increased LE strength 4/5. Patient will be able to ambulate indoors or outdoors for a daily 10 minute walk to maintain balance and improve endurance level with no difficulty.       PLAN  [x]  Upgrade activities as tolerated     [x]  Continue plan of care  []  Update interventions per flow sheet       []  Discharge due to:_  []  Other:_      Kaila Sky Massing 10/25/2021

## 2021-10-27 ENCOUNTER — HOSPITAL ENCOUNTER (OUTPATIENT)
Dept: PHYSICAL THERAPY | Age: 71
Discharge: HOME | End: 2021-10-27
Payer: MEDICARE

## 2021-10-27 PROCEDURE — 97112 NEUROMUSCULAR REEDUCATION: CPT

## 2021-10-27 PROCEDURE — 97110 THERAPEUTIC EXERCISES: CPT

## 2021-10-27 NOTE — PROGRESS NOTES
PT DAILY TREATMENT NOTE - [facility] 2-15    Patient Name: Kurtis Gould  Date:10/27/2021  : 1950  [x]  Patient  Verified  Payor: Domenico Alt / Plan: [place] MEDICARE PART A & B / Product Type: Medicare /    In time:140 pm   Out time:238 pm   Total Treatment Time (min): 58  Total Timed Codes (min): 58  1:1 Treatment Time ( [address] only): 62   Visit #:  8    Treatment Area: Gait instability [R26.81]    SUBJECTIVE  Pain Level (0-10 scale): 0/10  Any medication changes, allergies to medications, adverse drug reactions, diagnosis change, or new procedure performed?: [x] No    [] Yes (see summary sheet for update)  Subjective functional status/changes:   [] No changes reported  \"Pt reports no pain just sore, and tired at times. \"    OBJECTIVE    30 min Therapeutic Exercise:  [x] See flow sheet :   Rationale: increase ROM, increase strength and improve coordination to improve the patients ability to increase general legs and trunk strength         28 min Neuromuscular Re-education:  []  See flow sheet :   Rationale: improve coordination, improve balance and increase proprioception  to improve the patients ability to increase safety with mobility and community level activity. With   [] TE   [] TA   [] neuro   [] other: Patient Education: [x] Review HEP    [] Progressed/Changed HEP based on:   [] positioning   [] body mechanics   [] transfers   [] heat/ice application    [] other:        Pain Level (0-10 scale) post treatment: 0/10    ASSESSMENT/Changes in Function:   The pt tolerated treatment session with continued ex for LE and core strength use of mirror to posture correction. Pt also working on stepping and proprioception ex in // bars due to unsteadiness with crossing legs and single leg stance tasks. Pt notes compliance with HEP.  .   Patient will continue to benefit from skilled PT services to modify and progress therapeutic interventions, address functional mobility deficits, address ROM deficits, address strength deficits, analyze and address soft tissue restrictions and analyze and cue movement patterns to attain remaining goals     [x]  See Plan of Care  []  See progress note/recertification  []  See Discharge Summary         Progress towards goals / Updated goals:  Short Term Goals: To be accomplished in 5 treatments. To be able to perform sit to stand x 10 with no hand support with increasing strength of LE to 4/5. Patient will be able to ambulate up and down 4 steps with 1 hand support safely to get in and out of the house. Patient will be able to perform HEP independently x 10-20 reps with no difficulty to maintain mobility and strength.     Long Term Goals: To be accomplished in 10  treatments. Patient will be able to squat and  items off the floor holding onto chair with little difficulty. Patient will be able to perform household chores for at least 10 minutes without rest with increased LE strength 4/5. Patient will be able to ambulate indoors or outdoors for a daily 10 minute walk to maintain balance and improve endurance level with no difficulty.       PLAN  [x]  Upgrade activities as tolerated     [x]  Continue plan of care  []  Update interventions per flow sheet       []  Discharge due to:_  []  Other:_      Melyssa Dupont 10/27/2021

## 2021-11-01 ENCOUNTER — HOSPITAL ENCOUNTER (OUTPATIENT)
Dept: PHYSICAL THERAPY | Age: 71
Discharge: HOME | End: 2021-11-01
Payer: MEDICARE

## 2021-11-01 PROCEDURE — 97150 GROUP THERAPEUTIC PROCEDURES: CPT

## 2021-11-01 NOTE — PROGRESS NOTES
PT DAILY TREATMENT NOTE - [facility] 2-15    Patient Name: Sisi Mckeon  Date:2021  : 1950  [x]  Patient  Verified  Payor: Cachorro Cisneros / Plan: [place] MEDICARE PART A & B / Product Type: Medicare /    In time:131 pm  Out time:230 pm   Total Treatment Time (min): 59  Total Timed Codes (min): 59  1:1 Treatment Time ( only): 0   Visit #:  9    Treatment Area: Gait instability [R26.81]    SUBJECTIVE  Pain Level (0-10 scale): 0/10  Any medication changes, allergies to medications, adverse drug reactions, diagnosis change, or new procedure performed?: [x] No    [] Yes (see summary sheet for update)  Subjective functional status/changes:   [] No changes reported  \"pt reports some fatigue no pain. \"    OBJECTIVE          59 min Group Therapy:  [] See flow sheet :   Billed while completing peer interaction throughout session with therapist     With   [] TE   [] TA   [] neuro   [] other: Patient Education: [x] Review HEP    [] Progressed/Changed HEP based on:   [] positioning   [] body mechanics   [] transfers   [] heat/ice application    [] other:          Pain Level (0-10 scale) post treatment: 0*/10    ASSESSMENT/Changes in Function:   The pt tolerated treatment session pt continues to work on functional strength  And coordinated balance activities to increase over all community level mobility with reduced fall risk. Pt notes compliance with HEP and no issues other then fatigue when finished. .   Patient will continue to benefit from skilled PT services to modify and progress therapeutic interventions, address functional mobility deficits, address ROM deficits, address strength deficits and analyze and cue movement patterns to attain remaining goals     [x]  See Plan of Care  []  See progress note/recertification  []  See Discharge Summary         Progress towards goals / Updated goals:  Short Term Goals: To be accomplished in 5 treatments.   To be able to perform sit to stand x 10 with no hand support with increasing strength of LE to 4/5. Patient will be able to ambulate up and down 4 steps with 1 hand support safely to get in and out of the house. Patient will be able to perform HEP independently x 10-20 reps with no difficulty to maintain mobility and strength.     Long Term Goals: To be accomplished in 10  treatments. Patient will be able to squat and  items off the floor holding onto chair with little difficulty. Patient will be able to perform household chores for at least 10 minutes without rest with increased LE strength 4/5. Patient will be able to ambulate indoors or outdoors for a daily 10 minute walk to maintain balance and improve endurance level with no difficulty.          PLAN  [x]  Upgrade activities as tolerated     [x]  Continue plan of care  []  Update interventions per flow sheet       []  Discharge due to:_  []  Other:_      Evelin Hogan 11/1/2021

## 2021-11-03 ENCOUNTER — HOSPITAL ENCOUNTER (OUTPATIENT)
Dept: PHYSICAL THERAPY | Age: 71
Discharge: HOME | End: 2021-11-03
Payer: MEDICARE

## 2021-11-03 PROCEDURE — 97161 PT EVAL LOW COMPLEX 20 MIN: CPT

## 2021-11-03 PROCEDURE — 97110 THERAPEUTIC EXERCISES: CPT

## 2021-11-03 NOTE — PROGRESS NOTES
PT DAILY TREATMENT NOTE - [facility] 2-15    Patient Name: Colleen Patel  Date:11/3/2021  : 1950  [x]  Patient  Verified  Payor: Song Raymond / Plan: [place] MEDICARE PART A & B / Product Type: Medicare /    In time:1335  Out time:1405  Total Treatment Time (min): 30  Total Timed Codes (min): 10  1:1 Treatment Time ( [address] only): 30   Visit #:  10    Treatment Area: Gait instability [R26.81]    SUBJECTIVE  Pain Level (0-10 scale): 0/10  Any medication changes, allergies to medications, adverse drug reactions, diagnosis change, or new procedure performed?: [x] No    [] Yes (see summary sheet for update)  Subjective functional status/changes:   [] No changes reported  \"I am doing well, I have really enjoyed therapy. \"    OBJECTIVE    10 min Therapeutic Exercise:  [x] See flow sheet :   Rationale: increase ROM, increase strength and improve balance to improve the patients ability to perform functional mobility independently      With   [] TE   [] TA   [] neuro   [] other: Patient Education: [x] Review HEP    [] Progressed/Changed HEP based on:   [] positioning   [] body mechanics   [] transfers   [] heat/ice application    [] other:      Other Objective/Functional Measures: Rice Balance Score is 50/56 with her only issues being with single limb stance and tandem stance     Hip:   Strength       Right Left     Flexion 4+/5 4+/5     Extension 5/5 5/5     Abduction 5/5 5/5     Adduction 5/5 5/5                       Knee:   Strength       Right Left     Flexion 5/5 5/5     Extension 5/5 5/5   Ankle   Strength       Right Left     Dorsiflexion 5/5 5/5     Plantarflexion 5/5 5/5     Inversion 5/5 5/5     Eversion 5/5 5/5         Pain Level (0-10 scale) post treatment: 0/10    ASSESSMENT/Changes in Function:   The pt tolerated treatment well today. She has demonstrated significant gains in strength and improved her balance score overall since beginning her therapy.   She has met all of her goals and is in agreement with plan to discharge from therapy. []  See Plan of Care  []  See progress note/recertification  [x]  See Discharge Summary         Progress towards goals / Updated goals:  Short Term Goals: To be accomplished in 5 treatments. To be able to perform sit to stand x 10 with no hand support with increasing strength of LE to 4/5. Met  Patient will be able to ambulate up and down 4 steps with 1 hand support safely to get in and out of the house. Met  Patient will be able to perform HEP independently x 10-20 reps with no difficulty to maintain mobility and strength. Met     Long Term Goals: To be accomplished in 10  treatments. Patient will be able to squat and  items off the floor holding onto chair with little difficulty. Met  Patient will be able to perform household chores for at least 10 minutes without rest with increased LE strength 4/5. Met  Patient will be able to ambulate indoors or outdoors for a daily 10 minute walk to maintain balance and improve endurance level with no difficulty. Met    PLAN  []  Upgrade activities as tolerated     []  Continue plan of care  []  Update interventions per flow sheet       [x]  Discharge due to:   All goals met  []  Other:_      Abundio Diaz, PT, DPT 11/3/2021

## 2021-11-03 NOTE — PROGRESS NOTES
[address]  [place], [place]  Ph: [phone]     Fax: [phone]    Discharge Summary 2-15    Patient name: Josey Bell  : 1950  Provider#: [id number]  Referral source: Thelma Staton MD      Medical/Treatment Diagnosis: Gait instability [R26.81]     Prior Hospitalization: see medical history     Comorbidities: See Plan of Care  Prior Level of Function: See Plan of Care  Medications: Verified on Patient Summary List    Start of Care: 10/4/2021      Onset Date:2021   Visits from Start of Care: 10   Missed Visits: 0  Reporting Period : 10/4/2021 to 11/3/2021    Assessment / Summary of care: The pt tolerated treatment well today. She has demonstrated significant gains in strength and improved her balance score overall since beginning her therapy. She has met all of her goals and is in agreement with plan to discharge from therapy. Progress towards goals / Updated goals:  Short Term Goals: To be accomplished in 5 treatments. To be able to perform sit to stand x 10 with no hand support with increasing strength of LE to 4/5. Met  Patient will be able to ambulate up and down 4 steps with 1 hand support safely to get in and out of the house. Met  Patient will be able to perform HEP independently x 10-20 reps with no difficulty to maintain mobility and strength. Met     Long Term Goals: To be accomplished in 10  treatments. Patient will be able to squat and  items off the floor holding onto chair with little difficulty. Met  Patient will be able to perform household chores for at least 10 minutes without rest with increased LE strength 4/5. Met  Patient will be able to ambulate indoors or outdoors for a daily 10 minute walk to maintain balance and improve endurance level with no difficulty.   Met      RECOMMENDATIONS:  [x]Discontinue therapy: [x]Patient has reached or is progressing toward set goals     []Patient is non-compliant or has abdicated     []Due to lack of appreciable progress towards set goals     []Other    Jazmine Acosta, PT, DPT 11/3/2021

## 2021-11-08 ENCOUNTER — APPOINTMENT (OUTPATIENT)
Dept: PHYSICAL THERAPY | Age: 71
End: 2021-11-08
Payer: MEDICARE

## 2021-11-10 ENCOUNTER — APPOINTMENT (OUTPATIENT)
Dept: PHYSICAL THERAPY | Age: 71
End: 2021-11-10
Payer: MEDICARE

## 2022-04-20 DIAGNOSIS — K21.00 GASTROESOPHAGEAL REFLUX DISEASE WITH ESOPHAGITIS WITHOUT HEMORRHAGE: ICD-10-CM

## 2022-04-21 RX ORDER — PANTOPRAZOLE SODIUM 40 MG/1
TABLET, DELAYED RELEASE ORAL
Qty: 90 TABLET | Refills: 3 | Status: SHIPPED | OUTPATIENT
Start: 2022-04-21

## 2023-03-13 DIAGNOSIS — K21.00 GASTROESOPHAGEAL REFLUX DISEASE WITH ESOPHAGITIS WITHOUT HEMORRHAGE: ICD-10-CM

## 2023-03-20 RX ORDER — PANTOPRAZOLE SODIUM 40 MG/1
TABLET, DELAYED RELEASE ORAL
Qty: 90 TABLET | Refills: 3 | Status: SHIPPED | OUTPATIENT
Start: 2023-03-20

## 2023-05-25 RX ORDER — PANTOPRAZOLE SODIUM 40 MG/1
1 TABLET, DELAYED RELEASE ORAL DAILY
COMMUNITY
Start: 2023-03-20

## 2023-05-25 RX ORDER — LORATADINE 10 MG/1
10 TABLET ORAL DAILY PRN
COMMUNITY

## 2023-05-25 RX ORDER — ONDANSETRON 4 MG/1
4 TABLET, ORALLY DISINTEGRATING ORAL EVERY 8 HOURS PRN
COMMUNITY
Start: 2021-06-07

## 2023-05-25 RX ORDER — CARBAMAZEPINE 200 MG/1
200 TABLET ORAL 3 TIMES DAILY
COMMUNITY

## 2023-05-29 ENCOUNTER — HOSPITAL ENCOUNTER (EMERGENCY)
Facility: HOSPITAL | Age: 73
Discharge: HOME | End: 2023-05-29
Attending: EMERGENCY MEDICINE
Payer: MEDICARE

## 2023-05-29 VITALS
DIASTOLIC BLOOD PRESSURE: 61 MMHG | SYSTOLIC BLOOD PRESSURE: 122 MMHG | HEART RATE: 81 BPM | BODY MASS INDEX: 17.9 KG/M2 | RESPIRATION RATE: 16 BRPM | WEIGHT: 125 LBS | HEIGHT: 70 IN | OXYGEN SATURATION: 100 % | TEMPERATURE: 98 F

## 2023-05-29 DIAGNOSIS — S01.01XA LACERATION OF SCALP, INITIAL ENCOUNTER: Primary | ICD-10-CM

## 2023-05-29 PROCEDURE — 2500000003 HC RX 250 WO HCPCS: Performed by: EMERGENCY MEDICINE

## 2023-05-29 PROCEDURE — 12001 RPR S/N/AX/GEN/TRNK 2.5CM/<: CPT

## 2023-05-29 PROCEDURE — 99282 EMERGENCY DEPT VISIT SF MDM: CPT

## 2023-05-29 PROCEDURE — 96372 THER/PROPH/DIAG INJ SC/IM: CPT

## 2023-05-29 RX ORDER — LIDOCAINE HYDROCHLORIDE 20 MG/ML
5 INJECTION, SOLUTION EPIDURAL; INFILTRATION; INTRACAUDAL; PERINEURAL
Status: COMPLETED | OUTPATIENT
Start: 2023-05-29 | End: 2023-05-29

## 2023-05-29 RX ADMIN — LIDOCAINE HYDROCHLORIDE 5 ML: 20 INJECTION, SOLUTION EPIDURAL; INFILTRATION; INTRACAUDAL; PERINEURAL at 20:17

## 2023-05-29 ASSESSMENT — ENCOUNTER SYMPTOMS
EYES NEGATIVE: 1
ALLERGIC/IMMUNOLOGIC NEGATIVE: 1
RESPIRATORY NEGATIVE: 1
GASTROINTESTINAL NEGATIVE: 1
ROS SKIN COMMENTS: SCALP LACERATION.

## 2023-05-29 ASSESSMENT — PAIN SCALES - GENERAL
PAINLEVEL_OUTOF10: 5
PAINLEVEL_OUTOF10: 0

## 2023-05-29 ASSESSMENT — PAIN - FUNCTIONAL ASSESSMENT
PAIN_FUNCTIONAL_ASSESSMENT: 0-10
PAIN_FUNCTIONAL_ASSESSMENT: 0-10

## 2023-05-30 NOTE — ED NOTES
Discharged home to self. Ambulatory out of ED. VS WDL.  0 s/s acute distress. Respirations even and unlabored. Discharge instructions and follow up care reviewed. Patient receptive and demonstrated knowledge of instruction via teach-back method.         eSlin Maguire RN  05/29/23 0618

## 2023-05-30 NOTE — ED TRIAGE NOTES
Around 1800 today pt's foot caught and she feel and hit the left side of head head the one base of a buffet and hit the left side of her body on the ground. Pt state that her only pain is on the left side of her head and between her eyes from a headache. Pt denies blood thinners and rates pain 5/10 at this time. Pt has a lac on the left side of her scalp bleeding is controlled at this time. Pt took 800mg of ibuprofen at 1830 this evening. Pt denies LOC, dizziness, or vision changes after the fall.

## 2023-05-30 NOTE — ED PROVIDER NOTES
Patient is a 71 yo female presenting after tripping and hitting her head and sustaining a laceration to her scalp. No LOC. No other complaints or injuries. Review of Systems   Constitutional: Negative. HENT: Negative. Eyes: Negative. Respiratory: Negative. Cardiovascular: Negative. Gastrointestinal: Negative. Endocrine: Negative. Genitourinary: Negative. Musculoskeletal: Negative. Skin:         Scalp laceration. Allergic/Immunologic: Negative. Neurological: Negative. Hematological: Negative. Psychiatric/Behavioral: Negative. Physical Exam  Vitals and nursing note reviewed. HENT:      Head: Normocephalic. Comments: 2.5 cm laceration to left parietal scalp. Nose: Nose normal.   Eyes:      Extraocular Movements: Extraocular movements intact. Pupils: Pupils are equal, round, and reactive to light. Cardiovascular:      Rate and Rhythm: Normal rate and regular rhythm. Pulmonary:      Breath sounds: Normal breath sounds. Abdominal:      General: Abdomen is flat. Bowel sounds are normal.      Palpations: Abdomen is soft. Musculoskeletal:         General: Normal range of motion. Cervical back: Normal range of motion and neck supple. Skin:     General: Skin is warm. Neurological:      General: No focal deficit present. Mental Status: She is alert. Procedure. Site cleaned with betadine. 5 cc 2 % Lidocaine injected. Sutures X 3 placed with 4.0 Prolene. No complications. Patient tolerated well.        Milagros Garrison MD  05/29/23 2011       Milagros Garrison MD  05/29/23 2035

## 2024-04-10 ENCOUNTER — HOSPITAL ENCOUNTER (OUTPATIENT)
Facility: HOSPITAL | Age: 74
Setting detail: INFUSION SERIES
Discharge: HOME | End: 2024-04-10
Payer: MEDICARE

## 2024-04-10 VITALS
OXYGEN SATURATION: 100 % | TEMPERATURE: 97 F | SYSTOLIC BLOOD PRESSURE: 107 MMHG | DIASTOLIC BLOOD PRESSURE: 54 MMHG | HEART RATE: 78 BPM | RESPIRATION RATE: 18 BRPM

## 2024-04-10 PROCEDURE — 6360000002 HC RX W HCPCS: Performed by: FAMILY MEDICINE

## 2024-04-10 PROCEDURE — 96365 THER/PROPH/DIAG IV INF INIT: CPT

## 2024-04-10 PROCEDURE — 2580000003 HC RX 258: Performed by: FAMILY MEDICINE

## 2024-04-10 PROCEDURE — 96366 THER/PROPH/DIAG IV INF ADDON: CPT

## 2024-04-10 RX ADMIN — SODIUM CHLORIDE: 9 INJECTION, SOLUTION INTRAVENOUS at 14:36

## 2024-04-10 ASSESSMENT — PAIN SCALES - GENERAL
PAINLEVEL_OUTOF10: 0
PAINLEVEL_OUTOF10: 0

## 2024-04-10 NOTE — DISCHARGE INSTRUCTIONS
Return to [facility] daily through Friday at 2pm for Solumedrol 1 gram infusion

## 2024-04-11 ENCOUNTER — HOSPITAL ENCOUNTER (OUTPATIENT)
Facility: HOSPITAL | Age: 74
Setting detail: INFUSION SERIES
Discharge: HOME | End: 2024-04-11
Payer: MEDICARE

## 2024-04-11 VITALS
OXYGEN SATURATION: 100 % | TEMPERATURE: 97.8 F | DIASTOLIC BLOOD PRESSURE: 69 MMHG | RESPIRATION RATE: 95 BRPM | HEART RATE: 82 BPM | SYSTOLIC BLOOD PRESSURE: 112 MMHG

## 2024-04-11 PROCEDURE — 96365 THER/PROPH/DIAG IV INF INIT: CPT

## 2024-04-11 PROCEDURE — 6360000002 HC RX W HCPCS: Performed by: FAMILY MEDICINE

## 2024-04-11 PROCEDURE — 2580000003 HC RX 258: Performed by: FAMILY MEDICINE

## 2024-04-11 RX ADMIN — SODIUM CHLORIDE 1000 MG: 9 INJECTION, SOLUTION INTRAVENOUS at 14:30

## 2024-04-11 ASSESSMENT — PAIN SCALES - GENERAL
PAINLEVEL_OUTOF10: 0
PAINLEVEL_OUTOF10: 0

## 2024-04-12 ENCOUNTER — HOSPITAL ENCOUNTER (OUTPATIENT)
Facility: HOSPITAL | Age: 74
Setting detail: INFUSION SERIES
Discharge: HOME | End: 2024-04-12
Payer: MEDICARE

## 2024-04-12 VITALS
DIASTOLIC BLOOD PRESSURE: 58 MMHG | SYSTOLIC BLOOD PRESSURE: 121 MMHG | RESPIRATION RATE: 18 BRPM | TEMPERATURE: 97.9 F | HEART RATE: 75 BPM | OXYGEN SATURATION: 94 %

## 2024-04-12 PROCEDURE — 2580000003 HC RX 258: Performed by: FAMILY MEDICINE

## 2024-04-12 PROCEDURE — 96365 THER/PROPH/DIAG IV INF INIT: CPT

## 2024-04-12 PROCEDURE — 6360000002 HC RX W HCPCS: Performed by: FAMILY MEDICINE

## 2024-04-12 RX ADMIN — SODIUM CHLORIDE: 9 INJECTION, SOLUTION INTRAVENOUS at 14:11

## 2024-04-12 ASSESSMENT — PAIN SCALES - GENERAL
PAINLEVEL_OUTOF10: 0
PAINLEVEL_OUTOF10: 0

## 2024-06-14 RX ORDER — PANTOPRAZOLE SODIUM 40 MG/1
40 TABLET, DELAYED RELEASE ORAL DAILY
Qty: 90 TABLET | Refills: 3 | Status: SHIPPED | OUTPATIENT
Start: 2024-06-14 | End: 2024-06-15

## 2024-06-15 ENCOUNTER — APPOINTMENT (OUTPATIENT)
Facility: HOSPITAL | Age: 74
End: 2024-06-15
Attending: EMERGENCY MEDICINE
Payer: MEDICARE

## 2024-06-15 ENCOUNTER — HOSPITAL ENCOUNTER (EMERGENCY)
Facility: HOSPITAL | Age: 74
Discharge: HOME | End: 2024-06-15
Attending: EMERGENCY MEDICINE
Payer: MEDICARE

## 2024-06-15 VITALS
SYSTOLIC BLOOD PRESSURE: 125 MMHG | TEMPERATURE: 97.6 F | RESPIRATION RATE: 29 BRPM | DIASTOLIC BLOOD PRESSURE: 61 MMHG | HEART RATE: 74 BPM | BODY MASS INDEX: 17.79 KG/M2 | OXYGEN SATURATION: 100 % | WEIGHT: 124 LBS

## 2024-06-15 DIAGNOSIS — R10.13 ABDOMINAL PAIN, EPIGASTRIC: Primary | ICD-10-CM

## 2024-06-15 LAB
ALBUMIN SERPL-MCNC: 3.6 G/DL (ref 3.5–5)
ALBUMIN/GLOB SERPL: 1.4 (ref 1.1–2.2)
ALP SERPL-CCNC: 81 U/L (ref 45–117)
ALT SERPL-CCNC: 22 U/L (ref 12–78)
ANION GAP SERPL CALC-SCNC: 8 MMOL/L (ref 5–15)
AST SERPL W P-5'-P-CCNC: 13 U/L (ref 15–37)
BASOPHILS # BLD: 0 K/UL (ref 0–0.1)
BASOPHILS NFR BLD: 0 % (ref 0–1)
BILIRUB SERPL-MCNC: 0.7 MG/DL (ref 0.2–1)
BUN SERPL-MCNC: 14 MG/DL (ref 6–20)
BUN/CREAT SERPL: 14 (ref 12–20)
CA-I BLD-MCNC: 9.5 MG/DL (ref 8.5–10.1)
CHLORIDE SERPL-SCNC: 105 MMOL/L (ref 97–108)
CO2 SERPL-SCNC: 28 MMOL/L (ref 21–32)
CREAT SERPL-MCNC: 1.01 MG/DL (ref 0.55–1.02)
DIFFERENTIAL METHOD BLD: NORMAL
EKG ATRIAL RATE: 74 BPM
EKG DIAGNOSIS: NORMAL
EKG P AXIS: 79 DEGREES
EKG P-R INTERVAL: 142 MS
EKG Q-T INTERVAL: 388 MS
EKG QRS DURATION: 98 MS
EKG QTC CALCULATION (BAZETT): 453 MS
EKG R AXIS: 78 DEGREES
EKG T AXIS: 61 DEGREES
EKG VENTRICULAR RATE: 82 BPM
EOSINOPHIL # BLD: 0.1 K/UL (ref 0–0.4)
EOSINOPHIL NFR BLD: 1 % (ref 0–7)
ERYTHROCYTE [DISTWIDTH] IN BLOOD BY AUTOMATED COUNT: 13.4 % (ref 11.5–14.5)
GLOBULIN SER CALC-MCNC: 2.6 G/DL (ref 2–4)
GLUCOSE SERPL-MCNC: 89 MG/DL (ref 65–100)
HCT VFR BLD AUTO: 40.8 % (ref 35–47)
HGB BLD-MCNC: 13.5 G/DL (ref 11.5–16)
IMM GRANULOCYTES # BLD AUTO: 0 K/UL (ref 0–0.04)
IMM GRANULOCYTES NFR BLD AUTO: 0 % (ref 0–0.5)
LYMPHOCYTES # BLD: 2.3 K/UL (ref 0.8–3.5)
LYMPHOCYTES NFR BLD: 32 % (ref 12–49)
MCH RBC QN AUTO: 31.5 PG (ref 26–34)
MCHC RBC AUTO-ENTMCNC: 33.1 G/DL (ref 30–36.5)
MCV RBC AUTO: 95.1 FL (ref 80–99)
MONOCYTES # BLD: 0.5 K/UL (ref 0–1)
MONOCYTES NFR BLD: 6 % (ref 5–13)
NEUTS SEG # BLD: 4.2 K/UL (ref 1.8–8)
NEUTS SEG NFR BLD: 61 % (ref 32–75)
NRBC # BLD: 0 K/UL (ref 0–0.01)
NRBC BLD-RTO: 0 PER 100 WBC
PLATELET # BLD AUTO: 151 K/UL (ref 150–400)
PMV BLD AUTO: 10.2 FL (ref 8.9–12.9)
POTASSIUM SERPL-SCNC: 4.2 MMOL/L (ref 3.5–5.1)
PROT SERPL-MCNC: 6.2 G/DL (ref 6.4–8.2)
RBC # BLD AUTO: 4.29 M/UL (ref 3.8–5.2)
SODIUM SERPL-SCNC: 141 MMOL/L (ref 136–145)
TROPONIN I SERPL HS-MCNC: 7 NG/L (ref 0–51)
WBC # BLD AUTO: 7.1 K/UL (ref 3.6–11)

## 2024-06-15 PROCEDURE — 74176 CT ABD & PELVIS W/O CONTRAST: CPT

## 2024-06-15 PROCEDURE — 85025 COMPLETE CBC W/AUTO DIFF WBC: CPT

## 2024-06-15 PROCEDURE — 2580000003 HC RX 258: Performed by: EMERGENCY MEDICINE

## 2024-06-15 PROCEDURE — 96361 HYDRATE IV INFUSION ADD-ON: CPT

## 2024-06-15 PROCEDURE — 6370000000 HC RX 637 (ALT 250 FOR IP): Performed by: EMERGENCY MEDICINE

## 2024-06-15 PROCEDURE — 6360000002 HC RX W HCPCS: Performed by: EMERGENCY MEDICINE

## 2024-06-15 PROCEDURE — 99284 EMERGENCY DEPT VISIT MOD MDM: CPT

## 2024-06-15 PROCEDURE — 84484 ASSAY OF TROPONIN QUANT: CPT

## 2024-06-15 PROCEDURE — 80053 COMPREHEN METABOLIC PANEL: CPT

## 2024-06-15 PROCEDURE — 96374 THER/PROPH/DIAG INJ IV PUSH: CPT

## 2024-06-15 PROCEDURE — 36415 COLL VENOUS BLD VENIPUNCTURE: CPT

## 2024-06-15 PROCEDURE — 83690 ASSAY OF LIPASE: CPT

## 2024-06-15 RX ORDER — PANTOPRAZOLE SODIUM 40 MG/1
40 TABLET, DELAYED RELEASE ORAL
Status: COMPLETED | OUTPATIENT
Start: 2024-06-15 | End: 2024-06-15

## 2024-06-15 RX ORDER — ONDANSETRON 2 MG/ML
4 INJECTION INTRAMUSCULAR; INTRAVENOUS ONCE
Status: COMPLETED | OUTPATIENT
Start: 2024-06-15 | End: 2024-06-15

## 2024-06-15 RX ORDER — 0.9 % SODIUM CHLORIDE 0.9 %
1000 INTRAVENOUS SOLUTION INTRAVENOUS ONCE
Status: COMPLETED | OUTPATIENT
Start: 2024-06-15 | End: 2024-06-15

## 2024-06-15 RX ORDER — PANTOPRAZOLE SODIUM 40 MG/1
40 TABLET, DELAYED RELEASE ORAL
Qty: 30 TABLET | Refills: 1 | Status: SHIPPED | OUTPATIENT
Start: 2024-06-15

## 2024-06-15 RX ADMIN — ONDANSETRON 4 MG: 2 INJECTION INTRAMUSCULAR; INTRAVENOUS at 17:11

## 2024-06-15 RX ADMIN — PANTOPRAZOLE SODIUM 40 MG: 40 TABLET, DELAYED RELEASE ORAL at 19:39

## 2024-06-15 RX ADMIN — SODIUM CHLORIDE 1000 ML: 9 INJECTION, SOLUTION INTRAVENOUS at 17:12

## 2024-06-15 ASSESSMENT — ENCOUNTER SYMPTOMS
ABDOMINAL DISTENTION: 0
ALLERGIC/IMMUNOLOGIC NEGATIVE: 1
BLOOD IN STOOL: 0
EYES NEGATIVE: 1
RESPIRATORY NEGATIVE: 1
NAUSEA: 1
CONSTIPATION: 0
DIARRHEA: 0
ABDOMINAL PAIN: 1
ANAL BLEEDING: 0
VOMITING: 0
RECTAL PAIN: 0

## 2024-06-15 ASSESSMENT — PAIN DESCRIPTION - LOCATION: LOCATION: ABDOMEN

## 2024-06-15 ASSESSMENT — PAIN DESCRIPTION - ORIENTATION: ORIENTATION: MID

## 2024-06-15 ASSESSMENT — PAIN SCALES - GENERAL: PAINLEVEL_OUTOF10: 4

## 2024-06-15 ASSESSMENT — PAIN - FUNCTIONAL ASSESSMENT: PAIN_FUNCTIONAL_ASSESSMENT: 0-10

## 2024-06-15 NOTE — ED PROVIDER NOTES
[facility] EMERGENCY DEPT  EMERGENCY DEPARTMENT ENCOUNTER      Pt Name: Afsaneh Diaz  MRN: [id number]  Birthdate 1950  Date of evaluation: 6/15/2024  Provider: Manolo Aburto MD    CHIEF COMPLAINT       Chief Complaint   Patient presents with    Nausea    Abdominal Pain         HISTORY OF PRESENT ILLNESS   (Location/Symptom, Timing/Onset, Context/Setting, Quality, Duration, Modifying Factors, Severity)  Note limiting factors.   Afsaneh Diaz is a 74 y.o. female who presents to the emergency department upper abdominal pain beginning yesterday points to the epigastric right upper quadrant area.  Mild nausea no vomiting.  Status post cholecystectomy and appendectomy.  Patient had an episode of pancreatitis 11 years ago that was felt to be medication related-a seizure medication which she no longer takes    HPI    Nursing Notes were reviewed.    REVIEW OF SYSTEMS    (2-9 systems for level 4, 10 or more for level 5)     Review of Systems   Constitutional: Negative.    HENT: Negative.     Eyes: Negative.    Respiratory: Negative.     Cardiovascular: Negative.    Gastrointestinal:  Positive for abdominal pain and nausea. Negative for abdominal distention, anal bleeding, blood in stool, constipation, diarrhea, rectal pain and vomiting.   Endocrine: Negative.    Genitourinary: Negative.    Musculoskeletal: Negative.    Allergic/Immunologic: Negative.    Neurological: Negative.    Hematological: Negative.    Psychiatric/Behavioral: Negative.         Except as noted above the remainder of the review of systems was reviewed and negative.       PAST MEDICAL HISTORY     Past Medical History:   Diagnosis Date    MS (congenital mitral stenosis)          SURGICAL HISTORY       Past Surgical History:   Procedure Laterality Date    APPENDECTOMY      CHOLECYSTECTOMY, LAPAROSCOPIC      TONSILLECTOMY Bilateral          CURRENT MEDICATIONS       Previous Medications    CARBAMAZEPINE (TEGRETOL) 200 MG TABLET    Take 1 tablet by mouth 3 times  abdominal tenderness in the epigastric area. There is no right CVA tenderness, left CVA tenderness, guarding or rebound. Negative signs include Calhoun's sign and McBurney's sign.      Hernia: No hernia is present.      Comments: Mild to moderate tenderness to the epigastric area without guarding or rebound no lower abdominal tenderness   Musculoskeletal:         General: No swelling, tenderness, deformity or signs of injury. Normal range of motion.      Cervical back: Normal range of motion and neck supple. No rigidity or tenderness.      Right lower leg: No edema.      Left lower leg: No edema.   Skin:     General: Skin is warm and dry.      Capillary Refill: Capillary refill takes less than 2 seconds.      Findings: No erythema or rash.   Neurological:      General: No focal deficit present.      Mental Status: She is alert and oriented to person, place, and time.      Cranial Nerves: No cranial nerve deficit.      Sensory: No sensory deficit.      Motor: No weakness.      Coordination: Coordination normal.      Gait: Gait normal.   Psychiatric:         Mood and Affect: Mood normal.         Behavior: Behavior normal.         Thought Content: Thought content normal.         DIAGNOSTIC RESULTS     EKG: All EKG's are interpreted by the Emergency Department Physician who either signs or Co-signs this chart in the absence of a cardiologist.  EKG performed at 1641 interpreted at 1650 shows a sinus rhythm at 82 PACs no significant ST-T changes no evidence of STEMI WV interval is normal QRS duration mildly prolonged QRS axis is normal    RADIOLOGY:   Non-plain film images such as CT, Ultrasound and MRI are read by the radiologist. Plain radiographic images are visualized and preliminarily interpreted by the emergency physician with the below findings:        Interpretation per the Radiologist below, if available at the time of this note:    CT ABDOMEN PELVIS WO CONTRAST Additional Contrast? None   Final Result   Study

## 2024-06-15 NOTE — ED NOTES
Discharge instructions reviewed and patient verbalized understanding. Pt stable and ambulatory at discharge.

## 2024-06-15 NOTE — ED TRIAGE NOTES
Pt reports abd pain located around umbilicus that began yesterday. Pt reports rebound pain by self and rates this at 4/10. Pt reports some nausea as well. Pt has hx of pancreatitis, cholecystectomy, and appendectomy.

## 2024-06-20 LAB
EKG ATRIAL RATE: 74 BPM
EKG DIAGNOSIS: NORMAL
EKG P AXIS: 79 DEGREES
EKG P-R INTERVAL: 142 MS
EKG Q-T INTERVAL: 388 MS
EKG QRS DURATION: 98 MS
EKG QTC CALCULATION (BAZETT): 453 MS
EKG R AXIS: 78 DEGREES
EKG T AXIS: 61 DEGREES
EKG VENTRICULAR RATE: 82 BPM

## 2024-07-12 PROBLEM — R10.13 EPIGASTRIC ABDOMINAL PAIN: Status: ACTIVE | Noted: 2024-07-12

## 2024-07-16 DIAGNOSIS — R11.0 NAUSEA: ICD-10-CM

## 2024-07-16 DIAGNOSIS — Q23.2 CONGENITAL MITRAL STENOSIS: ICD-10-CM

## 2024-07-16 DIAGNOSIS — R10.13 EPIGASTRIC PAIN: Primary | ICD-10-CM

## 2024-07-16 DIAGNOSIS — G35 MULTIPLE SCLEROSIS (HCC): ICD-10-CM

## 2024-07-16 PROBLEM — K85.90 PANCREATITIS: Status: ACTIVE | Noted: 2024-07-16

## 2024-07-16 PROBLEM — K21.9 GERD (GASTROESOPHAGEAL REFLUX DISEASE): Status: ACTIVE | Noted: 2024-07-16

## 2024-07-17 ENCOUNTER — PREP FOR PROCEDURE (OUTPATIENT)
Age: 74
End: 2024-07-17

## 2024-07-17 ENCOUNTER — OFFICE VISIT (OUTPATIENT)
Age: 74
End: 2024-07-17
Payer: MEDICARE

## 2024-07-17 VITALS
WEIGHT: 126.4 LBS | SYSTOLIC BLOOD PRESSURE: 102 MMHG | OXYGEN SATURATION: 100 % | TEMPERATURE: 96 F | RESPIRATION RATE: 114 BRPM | HEIGHT: 70 IN | HEART RATE: 66 BPM | DIASTOLIC BLOOD PRESSURE: 80 MMHG | BODY MASS INDEX: 18.09 KG/M2

## 2024-07-17 DIAGNOSIS — K21.9 GASTROESOPHAGEAL REFLUX DISEASE, UNSPECIFIED WHETHER ESOPHAGITIS PRESENT: ICD-10-CM

## 2024-07-17 DIAGNOSIS — R10.13 EPIGASTRIC ABDOMINAL PAIN: Primary | ICD-10-CM

## 2024-07-17 DIAGNOSIS — G35 MULTIPLE SCLEROSIS (HCC): ICD-10-CM

## 2024-07-17 DIAGNOSIS — R11.0 NAUSEA: ICD-10-CM

## 2024-07-17 PROCEDURE — G8419 CALC BMI OUT NRM PARAM NOF/U: HCPCS | Performed by: INTERNAL MEDICINE

## 2024-07-17 PROCEDURE — 99204 OFFICE O/P NEW MOD 45 MIN: CPT | Performed by: INTERNAL MEDICINE

## 2024-07-17 PROCEDURE — 43235 EGD DIAGNOSTIC BRUSH WASH: CPT | Performed by: INTERNAL MEDICINE

## 2024-07-17 PROCEDURE — G8427 DOCREV CUR MEDS BY ELIG CLIN: HCPCS | Performed by: INTERNAL MEDICINE

## 2024-07-17 PROCEDURE — 1123F ACP DISCUSS/DSCN MKR DOCD: CPT | Performed by: INTERNAL MEDICINE

## 2024-07-17 PROCEDURE — 3017F COLORECTAL CA SCREEN DOC REV: CPT | Performed by: INTERNAL MEDICINE

## 2024-07-17 PROCEDURE — 1036F TOBACCO NON-USER: CPT | Performed by: INTERNAL MEDICINE

## 2024-07-17 PROCEDURE — G8400 PT W/DXA NO RESULTS DOC: HCPCS | Performed by: INTERNAL MEDICINE

## 2024-07-17 PROCEDURE — 1090F PRES/ABSN URINE INCON ASSESS: CPT | Performed by: INTERNAL MEDICINE

## 2024-07-17 RX ORDER — MIRTAZAPINE 7.5 MG/1
7.5 TABLET, FILM COATED ORAL
COMMUNITY
Start: 2024-06-20

## 2024-07-17 RX ORDER — SUCRALFATE 1 G/1
TABLET ORAL
COMMUNITY
Start: 2024-06-26

## 2024-07-17 RX ORDER — ONDANSETRON 4 MG/1
4 TABLET, ORALLY DISINTEGRATING ORAL EVERY 8 HOURS PRN
Qty: 30 TABLET | Refills: 3 | Status: SHIPPED | OUTPATIENT
Start: 2024-07-17

## 2024-07-17 RX ORDER — OXYCODONE HYDROCHLORIDE 5 MG/1
5 TABLET ORAL EVERY 4 HOURS PRN
COMMUNITY
Start: 2024-06-20

## 2024-07-17 ASSESSMENT — PATIENT HEALTH QUESTIONNAIRE - PHQ9
SUM OF ALL RESPONSES TO PHQ9 QUESTIONS 1 & 2: 0
2. FEELING DOWN, DEPRESSED OR HOPELESS: NOT AT ALL
SUM OF ALL RESPONSES TO PHQ QUESTIONS 1-9: 0
1. LITTLE INTEREST OR PLEASURE IN DOING THINGS: NOT AT ALL
SUM OF ALL RESPONSES TO PHQ QUESTIONS 1-9: 0

## 2024-07-17 ASSESSMENT — ENCOUNTER SYMPTOMS
NAUSEA: 0
RECTAL PAIN: 0
CONSTIPATION: 0
ANAL BLEEDING: 0
VOMITING: 0
ALLERGIC/IMMUNOLOGIC NEGATIVE: 1
DIARRHEA: 1
ABDOMINAL PAIN: 1
ABDOMINAL DISTENTION: 0
BLOOD IN STOOL: 0
RESPIRATORY NEGATIVE: 1

## 2024-07-17 NOTE — PROGRESS NOTES
1. Have you been to the ER, urgent care clinic since your last visit?  Hospitalized since your last visit? yes    2. Have you seen or consulted any other health care providers outside of the [facility] since your last visit?  Include any pap smears or colon screening.  No   Chief Complaint   Patient presents with    Abdominal Pain     Epigastric pain    Nausea    Follow-up     /80 (Site: Left Upper Arm, Position: Sitting, Cuff Size: Medium Adult)   Pulse 66   Temp (!) 96 °F (35.6 °C) (Temporal)   Resp (!) 114   Ht 1.778 m (5' 10\")   Wt 57.3 kg (126 lb 6.4 oz)   SpO2 100% Comment: room air  BMI 18.14 kg/m²       
Egd is scheduled for 7-19-24 AT 10:00 AM.  NO PA REQUIRED - MEDICARE.  
Multiple sclerosis (HCC)      3. Gastroesophageal reflux disease, unspecified whether esophagitis present      4. Nausea    - ondansetron (ZOFRAN-ODT) 4 MG disintegrating tablet; Place 1 tablet under the tongue every 8 hours as needed for Nausea  Dispense: 30 tablet; Refill: 3

## 2024-07-17 NOTE — H&P (VIEW-ONLY)
Afsaneh Diaz is a 74 y.o. female who presents today for the following:  Chief Complaint   Patient presents with    Abdominal Pain     Epigastric pain  since June 2024    Nausea    Follow-up    Diarrhea     At times    appetite  is not good     For a while         No Known Allergies    Current Outpatient Medications   Medication Sig Dispense Refill    mirtazapine (REMERON) 7.5 MG tablet Take 1 tablet by mouth nightly      oxyCODONE (ROXICODONE) 5 MG immediate release tablet Take 1 tablet by mouth every 4 hours as needed for Pain.      sucralfate (CARAFATE) 1 GM tablet TAKE 1 TABLET BY MOUTH THREE TIMES A DAY THREE TIMES A DAY BEFORE MEALS      ondansetron (ZOFRAN-ODT) 4 MG disintegrating tablet Place 1 tablet under the tongue every 8 hours as needed for Nausea 30 tablet 3    pantoprazole (PROTONIX) 40 MG tablet Take 1 tablet by mouth every morning (before breakfast) 30 tablet 1    loratadine (CLARITIN) 10 MG tablet Take 1 tablet by mouth daily as needed       No current facility-administered medications for this visit.       Past Medical History:   Diagnosis Date    Congenital mitral stenosis     Epigastric pain     GERD (gastroesophageal reflux disease)     MS (congenital mitral stenosis)     Multiple sclerosis (HCC)     Nausea     Pancreatitis     11 years ago       Past Surgical History:   Procedure Laterality Date    APPENDECTOMY      CHOLECYSTECTOMY, LAPAROSCOPIC      COLONOSCOPY  03/26/2019    TONSILLECTOMY Bilateral     TUBAL LIGATION         Family History   Problem Relation Age of Onset    Leukemia Mother     Cancer Other        Social History     Socioeconomic History    Marital status:      Spouse name: Not on file    Number of children: Not on file    Years of education: Not on file    Highest education level: Not on file   Occupational History    Not on file   Tobacco Use    Smoking status: Former     Types: Cigarettes    Smokeless tobacco: Never   Vaping Use    Vaping Use: Never used   Substance  Multiple sclerosis (HCC)      3. Gastroesophageal reflux disease, unspecified whether esophagitis present      4. Nausea    - ondansetron (ZOFRAN-ODT) 4 MG disintegrating tablet; Place 1 tablet under the tongue every 8 hours as needed for Nausea  Dispense: 30 tablet; Refill: 3

## 2024-07-19 ENCOUNTER — ANESTHESIA EVENT (OUTPATIENT)
Facility: HOSPITAL | Age: 74
End: 2024-07-19
Payer: MEDICARE

## 2024-07-19 ENCOUNTER — ANESTHESIA (OUTPATIENT)
Facility: HOSPITAL | Age: 74
End: 2024-07-19
Payer: MEDICARE

## 2024-07-19 ENCOUNTER — HOSPITAL ENCOUNTER (OUTPATIENT)
Facility: HOSPITAL | Age: 74
Setting detail: OUTPATIENT SURGERY
Discharge: HOME | End: 2024-07-19
Attending: INTERNAL MEDICINE | Admitting: INTERNAL MEDICINE
Payer: MEDICARE

## 2024-07-19 VITALS
SYSTOLIC BLOOD PRESSURE: 103 MMHG | RESPIRATION RATE: 18 BRPM | WEIGHT: 125.1 LBS | DIASTOLIC BLOOD PRESSURE: 52 MMHG | OXYGEN SATURATION: 97 % | HEIGHT: 69 IN | BODY MASS INDEX: 18.53 KG/M2 | TEMPERATURE: 97.1 F | HEART RATE: 69 BPM

## 2024-07-19 PROCEDURE — 2580000003 HC RX 258: Performed by: INTERNAL MEDICINE

## 2024-07-19 PROCEDURE — 7100000011 HC PHASE II RECOVERY - ADDTL 15 MIN: Performed by: INTERNAL MEDICINE

## 2024-07-19 PROCEDURE — 2709999900 HC NON-CHARGEABLE SUPPLY: Performed by: INTERNAL MEDICINE

## 2024-07-19 PROCEDURE — 6360000002 HC RX W HCPCS: Performed by: NURSE ANESTHETIST, CERTIFIED REGISTERED

## 2024-07-19 PROCEDURE — 3700000001 HC ADD 15 MINUTES (ANESTHESIA): Performed by: INTERNAL MEDICINE

## 2024-07-19 PROCEDURE — 7100000010 HC PHASE II RECOVERY - FIRST 15 MIN: Performed by: INTERNAL MEDICINE

## 2024-07-19 PROCEDURE — 3600007502: Performed by: INTERNAL MEDICINE

## 2024-07-19 PROCEDURE — 3700000000 HC ANESTHESIA ATTENDED CARE: Performed by: INTERNAL MEDICINE

## 2024-07-19 PROCEDURE — 88305 TISSUE EXAM BY PATHOLOGIST: CPT

## 2024-07-19 PROCEDURE — 6370000000 HC RX 637 (ALT 250 FOR IP): Performed by: NURSE ANESTHETIST, CERTIFIED REGISTERED

## 2024-07-19 PROCEDURE — 3600007512: Performed by: INTERNAL MEDICINE

## 2024-07-19 PROCEDURE — 2500000003 HC RX 250 WO HCPCS: Performed by: NURSE ANESTHETIST, CERTIFIED REGISTERED

## 2024-07-19 PROCEDURE — 88342 IMHCHEM/IMCYTCHM 1ST ANTB: CPT

## 2024-07-19 RX ORDER — SODIUM CHLORIDE 9 MG/ML
25 INJECTION, SOLUTION INTRAVENOUS PRN
Status: DISCONTINUED | OUTPATIENT
Start: 2024-07-19 | End: 2024-07-19 | Stop reason: HOSPADM

## 2024-07-19 RX ORDER — GLYCOPYRROLATE 0.2 MG/ML
INJECTION INTRAMUSCULAR; INTRAVENOUS PRN
Status: DISCONTINUED | OUTPATIENT
Start: 2024-07-19 | End: 2024-07-19 | Stop reason: SDUPTHER

## 2024-07-19 RX ORDER — DEXMEDETOMIDINE HYDROCHLORIDE 100 UG/ML
INJECTION, SOLUTION INTRAVENOUS PRN
Status: DISCONTINUED | OUTPATIENT
Start: 2024-07-19 | End: 2024-07-19 | Stop reason: SDUPTHER

## 2024-07-19 RX ORDER — PROPOFOL 10 MG/ML
INJECTION, EMULSION INTRAVENOUS PRN
Status: DISCONTINUED | OUTPATIENT
Start: 2024-07-19 | End: 2024-07-19 | Stop reason: SDUPTHER

## 2024-07-19 RX ADMIN — SODIUM CHLORIDE 25 ML: 9 INJECTION, SOLUTION INTRAVENOUS at 10:25

## 2024-07-19 RX ADMIN — TOPICAL ANESTHETIC 1 EACH: 200 SPRAY DENTAL; PERIODONTAL at 10:56

## 2024-07-19 RX ADMIN — PROPOFOL 50 MG: 10 INJECTION, EMULSION INTRAVENOUS at 11:08

## 2024-07-19 RX ADMIN — DEXMEDETOMIDINE 8 MCG: 100 INJECTION, SOLUTION INTRAVENOUS at 10:56

## 2024-07-19 RX ADMIN — GLYCOPYRROLATE 0.2 MG: 0.2 INJECTION INTRAMUSCULAR; INTRAVENOUS at 10:56

## 2024-07-19 RX ADMIN — SODIUM CHLORIDE: 9 INJECTION, SOLUTION INTRAVENOUS at 11:16

## 2024-07-19 ASSESSMENT — PAIN - FUNCTIONAL ASSESSMENT
PAIN_FUNCTIONAL_ASSESSMENT: 0-10

## 2024-07-19 NOTE — ANESTHESIA PRE PROCEDURE
Department of Anesthesiology  Preprocedure Note       Name:  Afsaneh Diaz   Age:  74 y.o.  :  1950                                          MRN:  [id number]         Date:  2024      Surgeon: Surgeon(s):  Rohini Francois Jr., MD    Procedure: Procedure(s):  ESOPHAGOGASTRODUODENOSCOPY    Medications prior to admission:   Prior to Admission medications    Medication Sig Start Date End Date Taking? Authorizing Provider   mirtazapine (REMERON) 7.5 MG tablet Take 1 tablet by mouth nightly 24   Luis Alberto Hines MD   oxyCODONE (ROXICODONE) 5 MG immediate release tablet Take 1 tablet by mouth every 4 hours as needed for Pain. 24   Luis Alberto Hines MD   sucralfate (CARAFATE) 1 GM tablet TAKE 1 TABLET BY MOUTH THREE TIMES A DAY THREE TIMES A DAY BEFORE MEALS 24   Luis Alberto Hines MD   ondansetron (ZOFRAN-ODT) 4 MG disintegrating tablet Place 1 tablet under the tongue every 8 hours as needed for Nausea 24   Rohini Francois Jr., MD   pantoprazole (PROTONIX) 40 MG tablet Take 1 tablet by mouth every morning (before breakfast) 6/15/24   Manolo Aburto MD   loratadine (CLARITIN) 10 MG tablet Take 1 tablet by mouth daily as needed    Automatic Reconciliation, Ar       Current medications:    Current Facility-Administered Medications   Medication Dose Route Frequency Provider Last Rate Last Admin   • 0.9 % sodium chloride infusion  25 mL IntraVENous PRN Rohini Francois Jr.,  mL/hr at 24 1025 25 mL at 24 1025       Allergies:  No Known Allergies    Problem List:    Patient Active Problem List   Diagnosis Code   • Epigastric abdominal pain R10.13   • Epigastric pain R10.13   • Pancreatitis K85.90   • Nausea R11.0   • Congenital mitral stenosis Q23.2   • Multiple sclerosis (HCC) G35   • GERD (gastroesophageal reflux disease) K21.9       Past Medical History:        Diagnosis Date   • Congenital mitral stenosis    • Epigastric pain    • GERD (gastroesophageal

## 2024-07-19 NOTE — OP NOTE
EGD Procedure Note        Patient: Afsaneh Diaz MRN: [id number]  SSN: [id number]    YOB: 1950  Age: 74 y.o.  Sex: female        Date/Time:  7/19/2024 11:22 AM         IMPRESSION:       Antral gastritis         RECOMMENDATIONS:    Check biopsy results.  Continue the pantoprazole 40 mg daily    Procedure: Esophagogastroduodenoscopy with cold biopsies    Indication: Epigastric abdominal pain    Endoscopist:  Rohini Francois Jr, MD    Referring Provider:   Pavan Sagluero MD    History: The history and physical exam were reviewed and updated.     Endoscope: GIF H190 Olympus video endoscope    Extent of Exam: third portion of the duodenum    ASA: ASA 2 - Patient with mild systemic disease with no functional limitations    Anethesia/Sedation:  TIVA    Description of the procedure:   The procedure was discussed with the patient including risks, benefits, alternatives including risks of iv sedation, bleeding, perforation and aspiration.  A safety timeout was performed. The patient was placed in the left lateral decubitus position.  A bite block was placed.  The patient was using standard protocol.  The patients vital signs were monitored at all times including heart rate/rhythm, blood pressure and oxygen saturation.  The endoscope was then passed under direct visualization to the third portion of the duodenum.  The endoscope was then slowly withdrawn while visualizing the mucosa.  In the stomach a retroflexion was performed and gastric fundus and cardia visualized. The patient was then transferred to recovery in stable condition.                Findings:   Esophagus:The esophageal mucosa was normal with no ulceration, mass or stricture. There was no evidence of Luevano's esophagus or reflux esophagitis.  Stomach: The gastric mucosa was inflamed in the antrum.  Multiple biopsies taken there..   Duodenum: The duodenum mucosa was normal with no ulceration, mass, stricture and no evidence of villous  atrophy.     Therapies:  None    Specimens:   ID Type Source Tests Collected by Time Destination   1 : gastric antrum Tissue Stomach SURGICAL PATHOLOGY Rohini Francois Jr., MD 7/19/2024 1111         Assistants:   Circulator: Doe Jackson RN  Scrub Person First: Lonnie Zaldivar    EBL:Minimal    Complications:   None; patient tolerated the procedure well.     Implants: None    Discharge disposition:  Out of the recovery area when discharge criteria met         Rohini Francois Jr, MD  July 19, 2024  11:22 AM

## 2024-07-19 NOTE — ANESTHESIA POSTPROCEDURE EVALUATION
Department of Anesthesiology  Postprocedure Note    Patient: Afsaneh Diaz  MRN: [id number]  YOB: 1950  Date of evaluation: 7/19/2024    Procedure Summary       Date: 07/19/24 Room / Location: [facility] ENDO 01 / [facility] ENDOSCOPY    Anesthesia Start: 1052 Anesthesia Stop: 1124    Procedure: ESOPHAGOGASTRODUODENOSCOPY BIOPSY (Mouth) Diagnosis:       Epigastric pain      (Epigastric pain [R10.13])    Surgeons: Rohini Francois Jr., MD Responsible Provider: Daniel Cartwright APRN - CRNA    Anesthesia Type: MAC ASA Status: 2            Anesthesia Type: MAC    Radha Phase I: Radha Score: 10    Radha Phase II:      Anesthesia Post Evaluation    Patient location during evaluation: bedside  Patient participation: complete - patient participated  Level of consciousness: sleepy but conscious  Pain score: 0  Airway patency: patent  Nausea & Vomiting: no vomiting and no nausea  Cardiovascular status: blood pressure returned to baseline  Respiratory status: room air  Hydration status: stable    No notable events documented.

## 2024-07-19 NOTE — INTERVAL H&P NOTE
Update History & Physical    The patient's History and Physical of July 19, 2024 was reviewed with the patient and I examined the patient. There was no change. The surgical site was confirmed by the patient and me.     Plan: The risks, benefits, expected outcome, and alternative to the recommended procedure have been discussed with the patient. Patient understands and wants to proceed with the procedure.     Electronically signed by Rohini Francois Jr, MD on 7/19/2024 at 10:11 AM

## 2024-07-19 NOTE — DISCHARGE INSTRUCTIONS
For the next 12 hours you should not:   1. drive   2. drink alcohol   3. operate any machinery   4. engage in activities that require mental sharpness or manual dexterity such as     cooking   5. take any drugs other than those prescribed by a physician   6. make any legal or financial decisions    Call your doctor's office immediately, if there is is anything unusual:   1. increased and continuing rectal bleeding   2. fever   3. Unusual abdominal pain    Take it easy today and resume normal activity tomorrow.It is common to have gas and mild bloating for a few hours. Pain is NOT normal. You may be groggy off and on for a few hours.    Resume previous diet.        Rohini Francois Jr, MD  7/19/2024  11:28 AM

## 2025-06-05 RX ORDER — PANTOPRAZOLE SODIUM 40 MG/1
40 TABLET, DELAYED RELEASE ORAL DAILY
Qty: 90 TABLET | Refills: 3 | Status: SHIPPED | OUTPATIENT
Start: 2025-06-05

## (undated) DEVICE — TUBING, SUCTION, 9/32" X 10', STRAIGHT: Brand: MEDLINE

## (undated) DEVICE — JELLY,LUBE,STERILE,FLIP TOP,TUBE,4-OZ: Brand: MEDLINE

## (undated) DEVICE — FORCEPS BX L240CM WRK CHN 2.8MM STD CAP W/ NDL MIC MESH

## (undated) DEVICE — SPONGE GZ W4XL4IN COT 12 PLY TYP VII WVN C FLD DSGN STERILE

## (undated) DEVICE — PAD,PREPPING,CUFFED,24X48,7",NONSTERILE: Brand: MEDLINE

## (undated) DEVICE — GLOVE ORANGE PI 7 1/2   MSG9075

## (undated) DEVICE — 1200CC GUARDIAN II: Brand: GUARDIAN

## (undated) DEVICE — SOLUTION IRRIG 1000ML STRL H2O USP PLAS POUR BTL

## (undated) DEVICE — MASK POM PROCEDURE OXY W/ HI CONCENTRATION CO2 MONITOR